# Patient Record
Sex: FEMALE | Race: WHITE | NOT HISPANIC OR LATINO | Employment: FULL TIME | URBAN - METROPOLITAN AREA
[De-identification: names, ages, dates, MRNs, and addresses within clinical notes are randomized per-mention and may not be internally consistent; named-entity substitution may affect disease eponyms.]

---

## 2017-01-10 ENCOUNTER — OFFICE VISIT - HEALTHEAST (OUTPATIENT)
Dept: INTERNAL MEDICINE | Facility: CLINIC | Age: 62
End: 2017-01-10

## 2017-01-10 DIAGNOSIS — R05.9 COUGH: ICD-10-CM

## 2017-01-10 LAB
CHOLEST SERPL-MCNC: 261 MG/DL
FASTING STATUS PATIENT QL REPORTED: YES
HDLC SERPL-MCNC: 57 MG/DL
LDLC SERPL CALC-MCNC: 156 MG/DL
TRIGL SERPL-MCNC: 240 MG/DL

## 2017-01-10 ASSESSMENT — MIFFLIN-ST. JEOR: SCORE: 1175.71

## 2017-01-12 ENCOUNTER — COMMUNICATION - HEALTHEAST (OUTPATIENT)
Dept: INTERNAL MEDICINE | Facility: CLINIC | Age: 62
End: 2017-01-12

## 2017-02-12 ENCOUNTER — COMMUNICATION - HEALTHEAST (OUTPATIENT)
Dept: INTERNAL MEDICINE | Facility: CLINIC | Age: 62
End: 2017-02-12

## 2017-03-14 ENCOUNTER — COMMUNICATION - HEALTHEAST (OUTPATIENT)
Dept: INTERNAL MEDICINE | Facility: CLINIC | Age: 62
End: 2017-03-14

## 2017-03-27 ENCOUNTER — OFFICE VISIT - HEALTHEAST (OUTPATIENT)
Dept: INTERNAL MEDICINE | Facility: CLINIC | Age: 62
End: 2017-03-27

## 2017-03-27 DIAGNOSIS — N39.0 UTI (URINARY TRACT INFECTION): ICD-10-CM

## 2017-03-27 ASSESSMENT — MIFFLIN-ST. JEOR: SCORE: 1180.25

## 2017-03-28 ENCOUNTER — COMMUNICATION - HEALTHEAST (OUTPATIENT)
Dept: INTERNAL MEDICINE | Facility: CLINIC | Age: 62
End: 2017-03-28

## 2017-04-19 ENCOUNTER — COMMUNICATION - HEALTHEAST (OUTPATIENT)
Dept: INTERNAL MEDICINE | Facility: CLINIC | Age: 62
End: 2017-04-19

## 2017-04-27 ENCOUNTER — COMMUNICATION - HEALTHEAST (OUTPATIENT)
Dept: INTERNAL MEDICINE | Facility: CLINIC | Age: 62
End: 2017-04-27

## 2017-04-27 DIAGNOSIS — I10 UNSPECIFIED ESSENTIAL HYPERTENSION: ICD-10-CM

## 2017-05-24 ENCOUNTER — COMMUNICATION - HEALTHEAST (OUTPATIENT)
Dept: INTERNAL MEDICINE | Facility: CLINIC | Age: 62
End: 2017-05-24

## 2017-06-28 ENCOUNTER — OFFICE VISIT - HEALTHEAST (OUTPATIENT)
Dept: INTERNAL MEDICINE | Facility: CLINIC | Age: 62
End: 2017-06-28

## 2017-06-28 DIAGNOSIS — J06.9 URI (UPPER RESPIRATORY INFECTION): ICD-10-CM

## 2017-06-28 ASSESSMENT — MIFFLIN-ST. JEOR: SCORE: 1180.25

## 2017-07-06 ENCOUNTER — COMMUNICATION - HEALTHEAST (OUTPATIENT)
Dept: INTERNAL MEDICINE | Facility: CLINIC | Age: 62
End: 2017-07-06

## 2017-07-24 ENCOUNTER — COMMUNICATION - HEALTHEAST (OUTPATIENT)
Dept: INTERNAL MEDICINE | Facility: CLINIC | Age: 62
End: 2017-07-24

## 2017-08-14 ENCOUNTER — RECORDS - HEALTHEAST (OUTPATIENT)
Dept: ADMINISTRATIVE | Facility: OTHER | Age: 62
End: 2017-08-14

## 2017-10-03 ENCOUNTER — AMBULATORY - HEALTHEAST (OUTPATIENT)
Dept: INTERNAL MEDICINE | Facility: CLINIC | Age: 62
End: 2017-10-03

## 2017-10-03 ENCOUNTER — COMMUNICATION - HEALTHEAST (OUTPATIENT)
Dept: INTERNAL MEDICINE | Facility: CLINIC | Age: 62
End: 2017-10-03

## 2017-10-03 DIAGNOSIS — Z12.31 ENCOUNTER FOR SCREENING MAMMOGRAM FOR BREAST CANCER: ICD-10-CM

## 2017-10-03 DIAGNOSIS — R52 PAIN: ICD-10-CM

## 2017-10-03 DIAGNOSIS — Z12.39 ENCOUNTER FOR BREAST CANCER SCREENING OTHER THAN MAMMOGRAM: ICD-10-CM

## 2017-11-06 ENCOUNTER — HOSPITAL ENCOUNTER (OUTPATIENT)
Dept: MAMMOGRAPHY | Facility: CLINIC | Age: 62
Discharge: HOME | End: 2017-11-06
Attending: INTERNAL MEDICINE

## 2017-11-06 DIAGNOSIS — Z12.31 ENCOUNTER FOR SCREENING MAMMOGRAM FOR BREAST CANCER: ICD-10-CM

## 2018-01-25 ENCOUNTER — RECORDS - HEALTHEAST (OUTPATIENT)
Dept: ADMINISTRATIVE | Facility: OTHER | Age: 63
End: 2018-01-25

## 2018-03-18 ENCOUNTER — COMMUNICATION - HEALTHEAST (OUTPATIENT)
Dept: INTERNAL MEDICINE | Facility: CLINIC | Age: 63
End: 2018-03-18

## 2018-04-30 ENCOUNTER — COMMUNICATION - HEALTHEAST (OUTPATIENT)
Dept: INTERNAL MEDICINE | Facility: CLINIC | Age: 63
End: 2018-04-30

## 2018-05-19 ENCOUNTER — COMMUNICATION - HEALTHEAST (OUTPATIENT)
Dept: INTERNAL MEDICINE | Facility: CLINIC | Age: 63
End: 2018-05-19

## 2018-05-19 DIAGNOSIS — I10 ESSENTIAL HYPERTENSION: ICD-10-CM

## 2018-05-30 ENCOUNTER — OFFICE VISIT - HEALTHEAST (OUTPATIENT)
Dept: INTERNAL MEDICINE | Facility: CLINIC | Age: 63
End: 2018-05-30

## 2018-05-30 DIAGNOSIS — H10.9 CONJUNCTIVITIS OF RIGHT EYE, UNSPECIFIED CONJUNCTIVITIS TYPE: ICD-10-CM

## 2018-05-30 DIAGNOSIS — H57.89 RED EYE: ICD-10-CM

## 2018-05-30 ASSESSMENT — MIFFLIN-ST. JEOR: SCORE: 1212

## 2018-08-29 ENCOUNTER — COMMUNICATION - HEALTHEAST (OUTPATIENT)
Dept: INTERNAL MEDICINE | Facility: CLINIC | Age: 63
End: 2018-08-29

## 2018-10-29 ENCOUNTER — AMBULATORY - HEALTHEAST (OUTPATIENT)
Dept: NURSING | Facility: CLINIC | Age: 63
End: 2018-10-29

## 2018-11-08 ENCOUNTER — COMMUNICATION - HEALTHEAST (OUTPATIENT)
Dept: INTERNAL MEDICINE | Facility: CLINIC | Age: 63
End: 2018-11-08

## 2018-12-14 ENCOUNTER — COMMUNICATION - HEALTHEAST (OUTPATIENT)
Dept: INTERNAL MEDICINE | Facility: CLINIC | Age: 63
End: 2018-12-14

## 2019-01-17 ENCOUNTER — COMMUNICATION - HEALTHEAST (OUTPATIENT)
Dept: INTERNAL MEDICINE | Facility: CLINIC | Age: 64
End: 2019-01-17

## 2019-01-22 ENCOUNTER — COMMUNICATION - HEALTHEAST (OUTPATIENT)
Dept: INTERNAL MEDICINE | Facility: CLINIC | Age: 64
End: 2019-01-22

## 2019-01-23 ENCOUNTER — AMBULATORY - HEALTHEAST (OUTPATIENT)
Dept: INTERNAL MEDICINE | Facility: CLINIC | Age: 64
End: 2019-01-23

## 2019-01-29 ENCOUNTER — OFFICE VISIT - HEALTHEAST (OUTPATIENT)
Dept: INTERNAL MEDICINE | Facility: CLINIC | Age: 64
End: 2019-01-29

## 2019-01-29 ENCOUNTER — AMBULATORY - HEALTHEAST (OUTPATIENT)
Dept: INTERNAL MEDICINE | Facility: CLINIC | Age: 64
End: 2019-01-29

## 2019-01-29 ENCOUNTER — HOSPITAL ENCOUNTER (OUTPATIENT)
Dept: CT IMAGING | Facility: CLINIC | Age: 64
Discharge: HOME | End: 2019-01-29
Attending: INTERNAL MEDICINE

## 2019-01-29 DIAGNOSIS — R10.84 ABDOMINAL PAIN, GENERALIZED: ICD-10-CM

## 2019-01-29 DIAGNOSIS — R31.29 MICROSCOPIC HEMATURIA: ICD-10-CM

## 2019-01-29 DIAGNOSIS — Z12.11 SPECIAL SCREENING FOR MALIGNANT NEOPLASMS, COLON: ICD-10-CM

## 2019-01-29 DIAGNOSIS — Z12.31 VISIT FOR SCREENING MAMMOGRAM: ICD-10-CM

## 2019-01-29 LAB
ALBUMIN SERPL-MCNC: 4.1 G/DL (ref 3.5–5)
ALBUMIN UR-MCNC: NEGATIVE MG/DL
ALP SERPL-CCNC: 91 U/L (ref 45–120)
ALT SERPL W P-5'-P-CCNC: 26 U/L (ref 0–45)
ANION GAP SERPL CALCULATED.3IONS-SCNC: 10 MMOL/L (ref 5–18)
APPEARANCE UR: CLEAR
AST SERPL W P-5'-P-CCNC: 16 U/L (ref 0–40)
BACTERIA #/AREA URNS HPF: ABNORMAL HPF
BILIRUB DIRECT SERPL-MCNC: 0.1 MG/DL
BILIRUB SERPL-MCNC: 0.4 MG/DL (ref 0–1)
BILIRUB UR QL STRIP: NEGATIVE
BUN SERPL-MCNC: 17 MG/DL (ref 8–22)
CALCIUM SERPL-MCNC: 10 MG/DL (ref 8.5–10.5)
CHLORIDE BLD-SCNC: 106 MMOL/L (ref 98–107)
CO2 SERPL-SCNC: 26 MMOL/L (ref 22–31)
COLOR UR AUTO: ABNORMAL
CREAT SERPL-MCNC: 0.87 MG/DL (ref 0.6–1.1)
ERYTHROCYTE [DISTWIDTH] IN BLOOD BY AUTOMATED COUNT: 13.3 % (ref 11–14.5)
GFR SERPL CREATININE-BSD FRML MDRD: >60 ML/MIN/1.73M2
GLUCOSE BLD-MCNC: 112 MG/DL (ref 70–125)
GLUCOSE UR STRIP-MCNC: NEGATIVE MG/DL
HCT VFR BLD AUTO: 40.2 % (ref 35–47)
HGB BLD-MCNC: 13.1 G/DL (ref 12–16)
HGB UR QL STRIP: ABNORMAL
KETONES UR STRIP-MCNC: NEGATIVE MG/DL
LEUKOCYTE ESTERASE UR QL STRIP: NEGATIVE
LIPASE SERPL-CCNC: 28 U/L (ref 0–52)
MCH RBC QN AUTO: 29.8 PG (ref 27–34)
MCHC RBC AUTO-ENTMCNC: 32.6 G/DL (ref 32–36)
MCV RBC AUTO: 92 FL (ref 80–100)
NITRATE UR QL: NEGATIVE
PH UR STRIP: 5.5 [PH] (ref 4.5–8)
PLATELET # BLD AUTO: 352 THOU/UL (ref 140–440)
PMV BLD AUTO: 11.2 FL (ref 8.5–12.5)
POTASSIUM BLD-SCNC: 4.5 MMOL/L (ref 3.5–5)
PROT SERPL-MCNC: 6.9 G/DL (ref 6–8)
RBC # BLD AUTO: 4.39 MILL/UL (ref 3.8–5.4)
RBC #/AREA URNS AUTO: ABNORMAL HPF
SODIUM SERPL-SCNC: 142 MMOL/L (ref 136–145)
SP GR UR STRIP: 1 (ref 1–1.03)
SQUAMOUS #/AREA URNS AUTO: ABNORMAL LPF
UROBILINOGEN UR STRIP-ACNC: ABNORMAL
WBC #/AREA URNS AUTO: ABNORMAL HPF
WBC: 7.9 THOU/UL (ref 4–11)

## 2019-01-29 ASSESSMENT — MIFFLIN-ST. JEOR: SCORE: 1212

## 2019-01-31 ENCOUNTER — COMMUNICATION - HEALTHEAST (OUTPATIENT)
Dept: INTERNAL MEDICINE | Facility: CLINIC | Age: 64
End: 2019-01-31

## 2019-02-11 ENCOUNTER — RECORDS - HEALTHEAST (OUTPATIENT)
Dept: ADMINISTRATIVE | Facility: OTHER | Age: 64
End: 2019-02-11

## 2019-02-19 ENCOUNTER — OFFICE VISIT - HEALTHEAST (OUTPATIENT)
Dept: INTERNAL MEDICINE | Facility: CLINIC | Age: 64
End: 2019-02-19

## 2019-02-19 ENCOUNTER — COMMUNICATION - HEALTHEAST (OUTPATIENT)
Dept: INTERNAL MEDICINE | Facility: CLINIC | Age: 64
End: 2019-02-19

## 2019-02-19 DIAGNOSIS — Z01.818 PREOPERATIVE EXAMINATION: ICD-10-CM

## 2019-02-19 LAB
ALBUMIN SERPL-MCNC: 4.1 G/DL (ref 3.5–5)
ALP SERPL-CCNC: 91 U/L (ref 45–120)
ALT SERPL W P-5'-P-CCNC: 23 U/L (ref 0–45)
ANION GAP SERPL CALCULATED.3IONS-SCNC: 8 MMOL/L (ref 5–18)
AST SERPL W P-5'-P-CCNC: 16 U/L (ref 0–40)
ATRIAL RATE - MUSE: 78 BPM
BILIRUB SERPL-MCNC: 0.3 MG/DL (ref 0–1)
BUN SERPL-MCNC: 14 MG/DL (ref 8–22)
CALCIUM SERPL-MCNC: 9.5 MG/DL (ref 8.5–10.5)
CHLORIDE BLD-SCNC: 106 MMOL/L (ref 98–107)
CO2 SERPL-SCNC: 29 MMOL/L (ref 22–31)
CREAT SERPL-MCNC: 0.83 MG/DL (ref 0.6–1.1)
DIASTOLIC BLOOD PRESSURE - MUSE: NORMAL MMHG
ERYTHROCYTE [DISTWIDTH] IN BLOOD BY AUTOMATED COUNT: 13.1 % (ref 11–14.5)
GFR SERPL CREATININE-BSD FRML MDRD: >60 ML/MIN/1.73M2
GLUCOSE BLD-MCNC: 98 MG/DL (ref 70–125)
HCT VFR BLD AUTO: 38.6 % (ref 35–47)
HGB BLD-MCNC: 13.2 G/DL (ref 12–16)
INTERPRETATION ECG - MUSE: NORMAL
MCH RBC QN AUTO: 29.5 PG (ref 27–34)
MCHC RBC AUTO-ENTMCNC: 34.3 G/DL (ref 32–36)
MCV RBC AUTO: 86 FL (ref 80–100)
P AXIS - MUSE: 51 DEGREES
PLATELET # BLD AUTO: 336 THOU/UL (ref 140–440)
PMV BLD AUTO: 7.9 FL (ref 7–10)
POTASSIUM BLD-SCNC: 4.6 MMOL/L (ref 3.5–5)
PR INTERVAL - MUSE: 174 MS
PROT SERPL-MCNC: 7.1 G/DL (ref 6–8)
QRS DURATION - MUSE: 86 MS
QT - MUSE: 366 MS
QTC - MUSE: 417 MS
R AXIS - MUSE: 61 DEGREES
RBC # BLD AUTO: 4.49 MILL/UL (ref 3.8–5.4)
SODIUM SERPL-SCNC: 143 MMOL/L (ref 136–145)
SYSTOLIC BLOOD PRESSURE - MUSE: NORMAL MMHG
T AXIS - MUSE: 51 DEGREES
VENTRICULAR RATE- MUSE: 78 BPM
WBC: 6.4 THOU/UL (ref 4–11)

## 2019-02-19 ASSESSMENT — MIFFLIN-ST. JEOR: SCORE: 1207.46

## 2019-02-21 ENCOUNTER — SURGERY - HEALTHEAST (OUTPATIENT)
Dept: SURGERY | Facility: CLINIC | Age: 64
End: 2019-02-21

## 2019-02-21 ENCOUNTER — ANESTHESIA - HEALTHEAST (OUTPATIENT)
Dept: SURGERY | Facility: CLINIC | Age: 64
End: 2019-02-21

## 2019-02-21 ASSESSMENT — MIFFLIN-ST. JEOR: SCORE: 1192.73

## 2019-04-06 ENCOUNTER — COMMUNICATION - HEALTHEAST (OUTPATIENT)
Dept: INTERNAL MEDICINE | Facility: CLINIC | Age: 64
End: 2019-04-06

## 2019-04-06 DIAGNOSIS — Z00.00 ROUTINE GENERAL MEDICAL EXAMINATION AT A HEALTH CARE FACILITY: ICD-10-CM

## 2019-06-07 ENCOUNTER — OFFICE VISIT - HEALTHEAST (OUTPATIENT)
Dept: INTERNAL MEDICINE | Facility: CLINIC | Age: 64
End: 2019-06-07

## 2019-06-07 DIAGNOSIS — H61.21 IMPACTED CERUMEN OF RIGHT EAR: ICD-10-CM

## 2019-06-07 DIAGNOSIS — R00.0 TACHYCARDIA: ICD-10-CM

## 2019-06-07 ASSESSMENT — MIFFLIN-ST. JEOR: SCORE: 1194.99

## 2019-06-20 ENCOUNTER — COMMUNICATION - HEALTHEAST (OUTPATIENT)
Dept: INTERNAL MEDICINE | Facility: CLINIC | Age: 64
End: 2019-06-20

## 2019-06-20 DIAGNOSIS — Z00.00 ROUTINE GENERAL MEDICAL EXAMINATION AT A HEALTH CARE FACILITY: ICD-10-CM

## 2019-07-04 ENCOUNTER — COMMUNICATION - HEALTHEAST (OUTPATIENT)
Dept: INTERNAL MEDICINE | Facility: CLINIC | Age: 64
End: 2019-07-04

## 2019-07-04 DIAGNOSIS — Z00.00 ROUTINE GENERAL MEDICAL EXAMINATION AT A HEALTH CARE FACILITY: ICD-10-CM

## 2019-08-29 ENCOUNTER — COMMUNICATION - HEALTHEAST (OUTPATIENT)
Dept: INTERNAL MEDICINE | Facility: CLINIC | Age: 64
End: 2019-08-29

## 2019-08-29 DIAGNOSIS — I10 ESSENTIAL HYPERTENSION: ICD-10-CM

## 2019-10-25 ENCOUNTER — COMMUNICATION - HEALTHEAST (OUTPATIENT)
Dept: INTERNAL MEDICINE | Facility: CLINIC | Age: 64
End: 2019-10-25

## 2019-10-25 DIAGNOSIS — Z00.00 ROUTINE GENERAL MEDICAL EXAMINATION AT A HEALTH CARE FACILITY: ICD-10-CM

## 2020-05-21 ENCOUNTER — COMMUNICATION - HEALTHEAST (OUTPATIENT)
Dept: INTERNAL MEDICINE | Facility: CLINIC | Age: 65
End: 2020-05-21

## 2020-05-21 DIAGNOSIS — I10 ESSENTIAL HYPERTENSION: ICD-10-CM

## 2020-05-21 DIAGNOSIS — Z00.00 ROUTINE GENERAL MEDICAL EXAMINATION AT A HEALTH CARE FACILITY: ICD-10-CM

## 2020-10-11 ENCOUNTER — COMMUNICATION - HEALTHEAST (OUTPATIENT)
Dept: INTERNAL MEDICINE | Facility: CLINIC | Age: 65
End: 2020-10-11

## 2020-10-11 DIAGNOSIS — Z00.00 ROUTINE GENERAL MEDICAL EXAMINATION AT A HEALTH CARE FACILITY: ICD-10-CM

## 2020-10-14 ENCOUNTER — AMBULATORY - HEALTHEAST (OUTPATIENT)
Dept: INTERNAL MEDICINE | Facility: CLINIC | Age: 65
End: 2020-10-14

## 2020-10-14 DIAGNOSIS — Z20.822 COVID-19 RULED OUT: ICD-10-CM

## 2020-10-22 ENCOUNTER — COMMUNICATION - HEALTHEAST (OUTPATIENT)
Dept: INTERNAL MEDICINE | Facility: CLINIC | Age: 65
End: 2020-10-22

## 2020-10-26 ENCOUNTER — RECORDS - HEALTHEAST (OUTPATIENT)
Dept: ADMINISTRATIVE | Facility: OTHER | Age: 65
End: 2020-10-26

## 2021-02-10 ENCOUNTER — HOSPITAL ENCOUNTER (OUTPATIENT)
Dept: MAMMOGRAPHY | Facility: CLINIC | Age: 66
Discharge: HOME | End: 2021-02-10
Attending: INTERNAL MEDICINE

## 2021-02-10 DIAGNOSIS — Z12.31 SCREENING MAMMOGRAM, ENCOUNTER FOR: ICD-10-CM

## 2021-05-24 ENCOUNTER — RECORDS - HEALTHEAST (OUTPATIENT)
Dept: ADMINISTRATIVE | Facility: CLINIC | Age: 66
End: 2021-05-24

## 2021-05-25 ENCOUNTER — RECORDS - HEALTHEAST (OUTPATIENT)
Dept: ADMINISTRATIVE | Facility: CLINIC | Age: 66
End: 2021-05-25

## 2021-05-27 ENCOUNTER — RECORDS - HEALTHEAST (OUTPATIENT)
Dept: ADMINISTRATIVE | Facility: CLINIC | Age: 66
End: 2021-05-27

## 2021-05-28 ENCOUNTER — RECORDS - HEALTHEAST (OUTPATIENT)
Dept: ADMINISTRATIVE | Facility: CLINIC | Age: 66
End: 2021-05-28

## 2021-05-29 ENCOUNTER — RECORDS - HEALTHEAST (OUTPATIENT)
Dept: ADMINISTRATIVE | Facility: CLINIC | Age: 66
End: 2021-05-29

## 2021-05-29 NOTE — TELEPHONE ENCOUNTER
Refill Approved    Rx renewed per Medication Renewal Policy. Medication was last renewed on 4/7/2019 for 100/0.  Last OV 6/7/2019    Cate Cheek, [facility] Triage/Med Refill 6/22/2019     Requested Prescriptions   Pending Prescriptions Disp Refills     pantoprazole (PROTONIX) 40 MG tablet 90 tablet 0     Sig: Take 1 tablet (40 mg total) by mouth daily.       GI Medications Refill Protocol Passed - 6/20/2019 10:00 AM        Passed - PCP or prescribing provider visit in last 12 or next 3 months.     Last office visit with prescriber/PCP: 1/29/2019 Juan Washington MD OR same dept: 6/7/2019 Adamaris Burch MD OR same specialty: 6/7/2019 Adamaris Burch MD  Last physical: 2/19/2019 Last MTM visit: Visit date not found   Next visit within 3 mo: Visit date not found  Next physical within 3 mo: Visit date not found  Prescriber OR PCP: Juan Washington MD  Last diagnosis associated with med order: 1. Routine general medical examination at a health care facility  - pantoprazole (PROTONIX) 40 MG tablet; Take 1 tablet (40 mg total) by mouth daily.  Dispense: 90 tablet; Refill: 0    If protocol passes may refill for 12 months if within 3 months of last provider visit (or a total of 15 months).

## 2021-05-29 NOTE — PROGRESS NOTES
Office Visit   Sarah Cisneros   64 y.o. female    Date of Visit: 6/7/2019    Chief Complaint   Patient presents with     Ear Fullness     Right ear plugged this week, no pain, trouble hearing        Assessment and Plan   1. Impacted cerumen of right ear  Will ask the CA to do a ear wash today.    2. Tachycardia  Her pulse is tachycardic but regular.  She just came from a track meet and states that she was walking fast and it was very hot.  Recommend that she keep a close eye on her pulse and if it remains over 100 over the next few days then she should come back in for reevaluation.  She understands and is in agreement with this plan.       No follow-ups on file.     History of Present Illness   This 64 y.o. old who comes in with a plugged right ear.  She has not been able to hear very well out of the right ear for the last week.  She has not had any symptoms of upper respiratory illness.  No cough, congestion, drainage, fever or chills.  She has had a plugged ear with cerumen in the past.  She has no other acute concerns today.  Her pulse was fast when her vitals were taken and she states [place]  She had to walk a long way to get to her car and is very warm out.  She is not having any chest pain or dizziness.  She does not check her vitals at home.    Review of Systems: As above, systems otherwise reviewed and negative.     Medications, Allergies and Problem List   Patient Active Problem List   Diagnosis     Lyme Disease     Hyperlipidemia     Essential Hypertension     Blood In Urine     Labyrinthitis     Lipoma     Current Outpatient Medications   Medication Sig Dispense Refill     atorvastatin (LIPITOR) 20 MG tablet TAKE 1 TABLET (20 MG TOTAL) BY MOUTH DAILY. 90 tablet 0     lisinopril-hydrochlorothiazide (PRINZIDE,ZESTORETIC) 10-12.5 mg per tablet TAKE 1 TABLET BY MOUTH DAILY. 30 tablet 11     pantoprazole (PROTONIX) 40 MG tablet TAKE 1 TABLET (40 MG TOTAL) BY MOUTH  "DAILY. 100 tablet 0     No current facility-administered medications for this visit.      No Known Allergies       Physical Exam     /82 (Patient Site: Right Arm, Patient Position: Sitting)   Pulse (!) 116   Ht 4' 9\" (1.448 m)   Wt 170 lb (77.1 kg)   SpO2 98%   BMI 36.79 kg/m      General: This is an alert, well-appearing female, sitting comfortably, no apparent distress.  HEENT: Her right external canal is occluded with cerumen.  Left external canal is open with some cerumen in the canal.  Heart: She is a regular rhythm with no murmurs.  Rate is tachycardic around 115.     Additional Information   Social History     Tobacco Use     Smoking status: Never Smoker     Smokeless tobacco: Never Used   Substance Use Topics     Alcohol use: No     Comment: rare     Drug use: No          Adamaris Burch MD    "

## 2021-05-30 ENCOUNTER — RECORDS - HEALTHEAST (OUTPATIENT)
Dept: ADMINISTRATIVE | Facility: CLINIC | Age: 66
End: 2021-05-30

## 2021-05-30 VITALS — BODY MASS INDEX: 34.43 KG/M2 | WEIGHT: 164 LBS | HEIGHT: 58 IN

## 2021-05-30 VITALS — BODY MASS INDEX: 34.63 KG/M2 | HEIGHT: 58 IN | WEIGHT: 165 LBS

## 2021-05-31 ENCOUNTER — RECORDS - HEALTHEAST (OUTPATIENT)
Dept: ADMINISTRATIVE | Facility: CLINIC | Age: 66
End: 2021-05-31

## 2021-05-31 VITALS — BODY MASS INDEX: 34.63 KG/M2 | WEIGHT: 165 LBS | HEIGHT: 58 IN

## 2021-05-31 NOTE — TELEPHONE ENCOUNTER
Refill Approved    Rx renewed per Medication Renewal Policy. Medication was last renewed on 5/19/18.    Roseanne BREE Austin, [facility] Triage/Med Refill 8/29/2019     Requested Prescriptions   Pending Prescriptions Disp Refills     lisinopril-hydrochlorothiazide (PRINZIDE,ZESTORETIC) 10-12.5 mg per tablet 90 tablet 3     Sig: Take 1 tablet by mouth daily.       Diuretics/Combination Diuretics Refill Protocol  Passed - 8/29/2019 11:18 AM        Passed - Visit with PCP or prescribing provider visit in past 12 months     Last office visit with prescriber/PCP: 1/29/2019 Juan Washington MD OR same dept: 6/7/2019 Adamaris Burch MD OR same specialty: 6/7/2019 Adamaris Burch MD  Last physical: 2/19/2019 Last MTM visit: Visit date not found   Next visit within 3 mo: Visit date not found  Next physical within 3 mo: Visit date not found  Prescriber OR PCP: Juan Washington MD  Last diagnosis associated with med order: 1. Essential hypertension  - lisinopril-hydrochlorothiazide (PRINZIDE,ZESTORETIC) 10-12.5 mg per tablet; Take 1 tablet by mouth daily.  Dispense: 90 tablet; Refill: 3    If protocol passes may refill for 12 months if within 3 months of last provider visit (or a total of 15 months).             Passed - Serum Potassium in past 12 months      Lab Results   Component Value Date    Potassium 4.6 02/19/2019             Passed - Serum Sodium in past 12 months      Lab Results   Component Value Date    Sodium 143 02/19/2019             Passed - Blood pressure on file in past 12 months     BP Readings from Last 1 Encounters:   06/07/19 132/82             Passed - Serum Creatinine in past 12 months      Creatinine   Date Value Ref Range Status   02/19/2019 0.83 0.60 - 1.10 mg/dL Final

## 2021-06-01 VITALS — BODY MASS INDEX: 36.11 KG/M2 | HEIGHT: 58 IN | WEIGHT: 172 LBS

## 2021-06-02 ENCOUNTER — RECORDS - HEALTHEAST (OUTPATIENT)
Dept: ADMINISTRATIVE | Facility: CLINIC | Age: 66
End: 2021-06-02

## 2021-06-02 VITALS — BODY MASS INDEX: 36.57 KG/M2 | HEIGHT: 57 IN | WEIGHT: 169.5 LBS

## 2021-06-02 VITALS — WEIGHT: 172 LBS | BODY MASS INDEX: 36.11 KG/M2 | HEIGHT: 58 IN

## 2021-06-02 VITALS — HEIGHT: 58 IN | WEIGHT: 171 LBS | BODY MASS INDEX: 35.89 KG/M2

## 2021-06-02 NOTE — TELEPHONE ENCOUNTER
Refill Approved    Rx renewed per Medication Renewal Policy. Medication was last renewed on 7/5/19.    Nimo Dacosta, [facility] Triage/Med Refill 10/25/2019     Requested Prescriptions   Pending Prescriptions Disp Refills     atorvastatin (LIPITOR) 20 MG tablet 90 tablet 0     Sig: Take 1 tablet (20 mg total) by mouth daily.       Statins Refill Protocol (Hmg CoA Reductase Inhibitors) Passed - 10/25/2019  8:34 AM        Passed - PCP or prescribing provider visit in past 12 months      Last office visit with prescriber/PCP: 1/29/2019 Juan Washington MD OR same dept: 6/7/2019 Adamaris Burch MD OR same specialty: 6/7/2019 Adamaris Burch MD  Last physical: 2/19/2019 Last MTM visit: Visit date not found   Next visit within 3 mo: Visit date not found  Next physical within 3 mo: Visit date not found  Prescriber OR PCP: Juan Washington MD  Last diagnosis associated with med order: 1. Routine general medical examination at a health care facility  - atorvastatin (LIPITOR) 20 MG tablet; Take 1 tablet (20 mg total) by mouth daily.  Dispense: 90 tablet; Refill: 0    If protocol passes may refill for 12 months if within 3 months of last provider visit (or a total of 15 months).

## 2021-06-03 VITALS — BODY MASS INDEX: 36.68 KG/M2 | HEIGHT: 57 IN | WEIGHT: 170 LBS

## 2021-06-08 NOTE — PROGRESS NOTES
Office Visit - Follow up    Sarah Cisneros   61 y.o. female    Date of Visit: 1/10/2017    Chief Complaint   Patient presents with     Cough     x 3 weeks     Shortness of Breath     Fatigue       Subjective: Chief complaint cough.    3 weeks' duration, which shortness of breath and generalized fatigue appears pale feels hot feels cold long duration never had a cough this morning.  Denies hemoptysis she is a nonsmoker the cough is described as dry nonproductive.  Feels more tired no chest pain but there is dyspnea on exertion she denies wheezing.    Colonoscopy dated July 13, 2011 by colorectal surgery group showed one isolated hyperplastic polyp.  Mammogram negative August 30, 2016.    No blood in stool or urine no blood in sputum.    Medication list reviewed generally well-tolerated.    ROS: A comprehensive review of systems was performed and was otherwise negative    Medications:  Prior to Admission medications    Medication Sig Start Date End Date Taking? Authorizing Provider   lisinopril-hydrochlorothiazide (PRINZIDE,ZESTORETIC) 10-12.5 mg per tablet TAKE 1 TABLET BY MOUTH DAILY. 4/16/16  Yes Juan Washington MD   pantoprazole (PROTONIX) 40 MG tablet TAKE 1 TABLET (40 MG TOTAL) BY MOUTH DAILY. 10/1/16  Yes Juan Washington MD   azithromycin (ZITHROMAX Z-JEMMA) 250 MG tablet Take 2 tablets (500 mg) on  Day 1,  followed by 1 tablet (250 mg) once daily on Days 2 through 5. 1/10/17 1/15/17  Juan Washington MD       Allergies: No Known Allergies    Immunizations:   Immunization History   Administered Date(s) Administered     Influenza, seasonal,quad inj 6-35 mos 10/22/2013     Tdap 01/02/2008       Exam Chest clear to auscultation and percussion.  Heart tones regular rhythm without murmur rub or gallop.  Abdomen soft nontender no organomegaly.  No peritoneal signs.  Extremities free of edema cyanosis or clubbing.  Neck veins nondistended no thyromegaly or scleral icterus noted, carotids full.  Skin warm and  dry easily conversant good spirited.  Normal intelligence.  Neurologically intact no gross localizing findings.    Assessment and Plan   Cough uncertain etiology check chest x-ray plus, principal laboratory tests including hemogram, principal to poor profile plus lipid panel and TSH today.  Start Z-Lorne as directed 5 day course plus hold lisinopril HCTZ for hypertension as lisinopril component may be causing cough.    Acid reflux disease also may be contributing to cough from silent reflux.    Hyperplastic colon polyp cecal mass.  Reports July 13, 2011.    Obesity BMI 35.  Addendum O2 sats 98% temperature 97.7.  RTC one months time.    Time: total time spent with the patient was 25 minutes of which >50% was spent in counseling and coordination of care    The following high BMI interventions were performed this visit: encouragement to exercise    Juan Washington MD    Patient Active Problem List   Diagnosis     Lyme Disease     Hyperlipidemia     Essential Hypertension     Blood In Urine     Labyrinthitis     Lipoma

## 2021-06-09 NOTE — PROGRESS NOTES
Office Visit - Follow up    Sarah Cisneros   61 y.o. female    Date of Visit: 3/27/2017    Chief Complaint   Patient presents with     Anal Itching     and also some burning on urinastion       Subjective: UTI.  Burning on urination with rectal itching.  Discussed hemorrhoid treatment with pump soaks plus Metamucil plus Preparation H with hydrocortisone topically and if no better consultation with colorectal specialist Dr. Susannah Velazquez at [phone].    The patient offers no other new complaints the patient has had a history of hypertension hyperlipidemia and acid reflux.    Her BMI is elevated at 35 see below.    No blood in stool or urine medication list reviewed generally well-tolerated.  Denies chest pain or shortness of breath and no hemoptysis.    ROS: A comprehensive review of systems was performed and was otherwise negative    Medications:  Prior to Admission medications    Medication Sig Start Date End Date Taking? Authorizing Provider   atorvastatin (LIPITOR) 20 MG tablet TAKE 1 TABLET (20 MG TOTAL) BY MOUTH DAILY. 3/14/17  Yes Juan Washington MD   lisinopril-hydrochlorothiazide (PRINZIDE,ZESTORETIC) 10-12.5 mg per tablet TAKE 1 TABLET BY MOUTH DAILY. 4/16/16  Yes Juan Washington MD   pantoprazole (PROTONIX) 40 MG tablet TAKE 1 TABLET (40 MG TOTAL) BY MOUTH DAILY. 10/1/16  Yes Juan Washington MD   ciprofloxacin HCl (CIPRO) 250 MG tablet Take 1 tablet (250 mg total) by mouth 2 (two) times a day for 10 days. 3/27/17 4/6/17  Juan Washingtno MD       Allergies: No Known Allergies    Immunizations:   Immunization History   Administered Date(s) Administered     Influenza, seasonal,quad inj 6-35 mos 10/22/2013     Tdap 01/02/2008       Exam Chest clear to auscultation and percussion.  Heart tones regular rhythm without murmur rub or gallop.  Abdomen soft nontender no organomegaly.  No peritoneal signs.  Extremities free of edema cyanosis or clubbing.  Neck veins nondistended no thyromegaly or  scleral icterus noted, carotids full.  Skin warm and dry easily conversant good spirited.  Normal intelligence.  Neurologically intact no gross localizing findings.    Assessment and Plan  Urinary tract infection check UA UC plus ciprofloxacin 250 mg twice daily for 10 days.  Push fluids.    Hemorrhoids likely external no vaginal discharge or discharge otherwise.  Suggest high-fiber diet Preparation H with hydrocortisone OTC topically to the perianal area if no better in a few days time suggest formal consultation with colorectal specialty group Dr. Susannah Velazquez presiding at [phone].    Hypertension control.    Obesity BMI elevated at 35+ see below.    Hyperlipidemia no history of MI or stroke continue Lipitor same.    Acid reflux disease continue Protonix 40 mg daily RTC 1 month's time for follow-up.    Time: total time spent with the patient was 25 minutes of which >50% was spent in counseling and coordination of care    The following high BMI interventions were performed this visit: encouragement to exercise    Juan Washington MD    Patient Active Problem List   Diagnosis     Lyme Disease     Hyperlipidemia     Essential Hypertension     Blood In Urine     Labyrinthitis     Lipoma

## 2021-06-11 NOTE — PROGRESS NOTES
[facility] Follow Up Note    Sarah Cisneros   62 y.o. female    Date of Visit: 6/28/2017    Chief Complaint   Patient presents with     Fever     at night, highest 101.9     Sore Throat     pain moving up to ear, 3 days     Subjective  This is a 62-year-old patient of Dr. Juan Washington.  She comes in with a 3 day history of a respiratory infection.  Symptoms have included a sore throat, ear discomfort, headache, and fever of 101 .  She denies any cough.  The symptoms have not necessarily worsened but they have not improved.  She offers no other particular complaints or problems at this time.    ROS A comprehensive review of systems was performed and was otherwise negative    Medications, allergies, and problem list were reviewed and updated    Exam  General Appearance:   On examination her blood pressure is 134/70.  Weight is 165 pounds and height is 69.5 inches.  BMI is 35.09.    Heart is in a sinus rhythm with a rate of 98 and no ectopy.    Both ears show some cerumen but otherwise appear clear.    Some tenderness over the frontal sinuses.    There are a couple of small enlarged cervical lymph nodes on the right side.    Throat appears erythematous and swollen without exudate.    The patient is alert and oriented ×3.      Assessment/Plan  1. URI (upper respiratory infection)       Upper respiratory infection.  She has responded well in the past to Zithromax and so I will order a Z-Lorne for her.  She will follow-up if she is not improving.  Body Mass Index was not assessed due to The patient was in with an acute medical issue..    Dennis Murray MD      Current Outpatient Prescriptions on File Prior to Visit   Medication Sig     atorvastatin (LIPITOR) 20 MG tablet TAKE 1 TABLET (20 MG TOTAL) BY MOUTH DAILY.     lisinopril-hydrochlorothiazide (PRINZIDE,ZESTORETIC) 10-12.5 mg per tablet TAKE 1 TABLET BY MOUTH DAILY.     pantoprazole (PROTONIX) 40 MG tablet TAKE 1 TABLET (40 MG TOTAL) BY  MOUTH DAILY.     No current facility-administered medications on file prior to visit.      No Known Allergies  Social History   Substance Use Topics     Smoking status: Never Smoker     Smokeless tobacco: None     Alcohol use No      Comment: rare

## 2021-06-12 NOTE — TELEPHONE ENCOUNTER
RN cannot approve Refill Request    RN can NOT refill this medication Protocol failed and NO refill given. Last office visit: 1/29/2019 Juan Washington MD Last Physical: 2/19/2019 Last MTM visit: Visit date not found Last visit same specialty: 6/7/2019 Adamaris Burch MD.  Next visit within 3 mo: Visit date not found  Next physical within 3 mo: Visit date not found      Nimo Dacosta, [facility] Connection Triage/Med Refill 10/13/2020    Requested Prescriptions   Pending Prescriptions Disp Refills     atorvastatin (LIPITOR) 20 MG tablet 90 tablet 2     Sig: Take 1 tablet (20 mg total) by mouth daily.       Statins Refill Protocol (Hmg CoA Reductase Inhibitors) Failed - 10/11/2020  7:07 PM        Failed - PCP or prescribing provider visit in past 12 months      Last office visit with prescriber/PCP: 1/29/2019 Juan Washington MD OR same dept: Visit date not found OR same specialty: 6/7/2019 Adamaris Burch MD  Last physical: 2/19/2019 Last MTM visit: Visit date not found   Next visit within 3 mo: Visit date not found  Next physical within 3 mo: Visit date not found  Prescriber OR PCP: Juan Washington MD  Last diagnosis associated with med order: 1. Routine general medical examination at a health care facility  - atorvastatin (LIPITOR) 20 MG tablet; Take 1 tablet (20 mg total) by mouth daily.  Dispense: 90 tablet; Refill: 2    If protocol passes may refill for 12 months if within 3 months of last provider visit (or a total of 15 months).

## 2021-06-18 NOTE — PROGRESS NOTES
"  Office Visit - Follow Up   Sarah Cisneros   63 y.o. female    Date of Visit: 5/30/2018    Chief Complaint   Patient presents with     Eye Problem     Rt red eye x 1 wk - no recent injuries        Assessment and Plan   1. Red eye  I am concerned about a possible viral conjunctivitis, may be herpes lesion etc.  We will have her see ophthalmology.  In the meantime I will give her some bacterial eyedrops to see if this makes a difference.  She is in agreement with that plan.  - Ambulatory referral to Ophthalmology    2. Conjunctivitis of right eye, unspecified conjunctivitis type  As above  - polymyxin B-trimethoprim (FOR POLYTRIM) 10,000 unit- 1 mg/mL Drop ophthalmic drops; 1-2 drops to the affected eye 4 (four) times a day for 7 days  Dispense: 1 Bottle; Refill: 0        Return if symptoms worsen or fail to improve.     History of Present Illness   This 63 y.o. old patient has been feeling well until about a week ago developed acute red eye.  Gets more inflamed and irritated at times and more red appearance.  Then he will get better again but always stays somewhat red.  No drainage.  No pain or vision loss or vision change.  She states it feels somewhat quite \"gravelly.  No trauma.    Review of Systems: A comprehensive review of systems was negative except as noted.     Medications, Allergies and Problem List   Reviewed and updated     Physical Exam   General Appearance:   She has conjunctival irritation and inflammation redness in the medial aspect of the right eye.  There is no sub-conjunctival hemorrhage as I can tell.    /80 (Patient Site: Right Arm, Patient Position: Sitting, Cuff Size: Adult Regular)  Pulse 84  Ht 4' 9.5\" (1.461 m)  Wt 172 lb (78 kg)  SpO2 98%  BMI 36.58 kg/m2         Additional Information   Current Outpatient Prescriptions   Medication Sig Dispense Refill     atorvastatin (LIPITOR) 20 MG tablet TAKE 1 TABLET (20 MG TOTAL) BY MOUTH DAILY. 90 tablet 0     " lisinopril-hydrochlorothiazide (PRINZIDE,ZESTORETIC) 10-12.5 mg per tablet TAKE 1 TABLET BY MOUTH DAILY. 30 tablet 11     pantoprazole (PROTONIX) 40 MG tablet TAKE 1 TABLET (40 MG TOTAL) BY MOUTH DAILY. 100 tablet 1     polymyxin B-trimethoprim (FOR POLYTRIM) 10,000 unit- 1 mg/mL Drop ophthalmic drops 1-2 drops to the affected eye 4 (four) times a day for 7 days 1 Bottle 0     No current facility-administered medications for this visit.      No Known Allergies  Social History   Substance Use Topics     Smoking status: Never Smoker     Smokeless tobacco: Never Used     Alcohol use No      Comment: rare       Review and/or order of clinical lab tests:  Review and/or order of radiology tests:  Review and/or order of medicine tests:  Discussion of test results with performing physician:  Decision to obtain old records and/or obtain history from someone other than the patient:  Review and summarization of old records and/or obtaining history from someone other than the patient and.or discussion of case with another health care provider:  Independent visualization of image, tracing or specimen itself:    Time: total time spent with the patient was 15 minutes of which >50% was spent in counseling and coordination of care     Theo Childs MD

## 2021-06-18 NOTE — LETTER
Letter by Juan Washington MD at      Author: Juan Washington MD Service: -- Author Type: --    Filed:  Encounter Date: 1/31/2019 Status: (Other)       Sarah Cisneros  [address] [facility] [address]             January 31, 2019         Dear Ms. Cisneros,    Below are the results from your recent visit:    Resulted Orders   HM2(CBC w/o Differential)   Result Value Ref Range    WBC 7.9 4.0 - 11.0 thou/uL    RBC 4.39 3.80 - 5.40 mill/uL    Hemoglobin 13.1 12.0 - 16.0 g/dL    Hematocrit 40.2 35.0 - 47.0 %    MCV 92 80 - 100 fL    MCH 29.8 27.0 - 34.0 pg    MCHC 32.6 32.0 - 36.0 g/dL    RDW 13.3 11.0 - 14.5 %    Platelets 352 140 - 440 thou/uL    MPV 11.2 8.5 - 12.5 fL   Basic Metabolic Panel   Result Value Ref Range    Sodium 142 136 - 145 mmol/L    Potassium 4.5 3.5 - 5.0 mmol/L    Chloride 106 98 - 107 mmol/L    CO2 26 22 - 31 mmol/L    Anion Gap, Calculation 10 5 - 18 mmol/L    Glucose 112 70 - 125 mg/dL    Calcium 10.0 8.5 - 10.5 mg/dL    BUN 17 8 - 22 mg/dL    Creatinine 0.87 0.60 - 1.10 mg/dL    GFR MDRD Af Amer >60 >60 mL/min/1.73m2    GFR MDRD Non Af Amer >60 >60 mL/min/1.73m2    Narrative    Fasting Glucose reference range is 70-99 mg/dL per  American Diabetes Association (ADA) guidelines.   Hepatic Profile   Result Value Ref Range    Bilirubin, Total 0.4 0.0 - 1.0 mg/dL    Bilirubin, Direct 0.1 <=0.5 mg/dL    Protein, Total 6.9 6.0 - 8.0 g/dL    Albumin 4.1 3.5 - 5.0 g/dL    Alkaline Phosphatase 91 45 - 120 U/L    AST 16 0 - 40 U/L    ALT 26 0 - 45 U/L   Lipase   Result Value Ref Range    Lipase 28 0 - 52 U/L   Urinalysis-UC if Indicated   Result Value Ref Range    Color, UA Straw Colorless, Yellow, Straw, Light Yellow    Clarity, UA Clear Clear    Glucose, UA Negative Negative    Bilirubin, UA Negative Negative    Ketones, UA Negative Negative    Specific Gravity, UA 1.004 1.001 - 1.030    Blood, UA Trace (!) Negative    pH, UA 5.5 4.5 - 8.0    Protein, UA Negative Negative mg/dL     Urobilinogen, UA <2.0 E.U./dL <2.0 E.U./dL, 2.0 E.U./dL    Nitrite, UA Negative Negative    Leukocytes, UA Negative Negative    Bacteria, UA Few (!) None Seen hpf    RBC, UA 3-5 (!) None Seen, 0-2 hpf    WBC, UA 0-5 None Seen, 0-5 hpf    Squam Epithel, UA 25-50 (!) None Seen, 0-5 lpf    Narrative    UC not indicated       All very good results  ------  Microscopic hematuria noted and would suggest a formal consultation at [facility].  Please call patient for this advice that she should seek out [facility] consultation for microscopic hematuria.  [phone] is [facility] number    Please call with questions or contact us using [employer].    Sincerely,        Electronically signed by Juna Washington MD

## 2021-06-18 NOTE — LETTER
Letter by Juan Washington MD at      Author: Juan Washington MD Service: -- Author Type: --    Filed:  Encounter Date: 2/19/2019 Status: (Other)       Sarah Cisneros  [address] [facility] [address]             February 19, 2019         Dear Ms. Cisneros,    Below are the results from your recent visit:    Resulted Orders   HM2(CBC w/o Differential)   Result Value Ref Range    WBC 6.4 4.0 - 11.0 thou/uL    RBC 4.49 3.80 - 5.40 mill/uL    Hemoglobin 13.2 12.0 - 16.0 g/dL    Hematocrit 38.6 35.0 - 47.0 %    MCV 86 80 - 100 fL    MCH 29.5 27.0 - 34.0 pg    MCHC 34.3 32.0 - 36.0 g/dL    RDW 13.1 11.0 - 14.5 %    Platelets 336 140 - 440 thou/uL    MPV 7.9 7.0 - 10.0 fL   Comprehensive Metabolic Panel   Result Value Ref Range    Sodium 143 136 - 145 mmol/L    Potassium 4.6 3.5 - 5.0 mmol/L    Chloride 106 98 - 107 mmol/L    CO2 29 22 - 31 mmol/L    Anion Gap, Calculation 8 5 - 18 mmol/L    Glucose 98 70 - 125 mg/dL    BUN 14 8 - 22 mg/dL    Creatinine 0.83 0.60 - 1.10 mg/dL    GFR MDRD Af Amer >60 >60 mL/min/1.73m2    GFR MDRD Non Af Amer >60 >60 mL/min/1.73m2    Bilirubin, Total 0.3 0.0 - 1.0 mg/dL    Calcium 9.5 8.5 - 10.5 mg/dL    Protein, Total 7.1 6.0 - 8.0 g/dL    Albumin 4.1 3.5 - 5.0 g/dL    Alkaline Phosphatase 91 45 - 120 U/L    AST 16 0 - 40 U/L    ALT 23 0 - 45 U/L    Narrative    Fasting Glucose reference range is 70-99 mg/dL per  American Diabetes Association (ADA) guidelines.       All very good results    Please call with questions or contact us using [employer].    Sincerely,        Electronically signed by Juan Washington MD

## 2021-06-23 NOTE — TELEPHONE ENCOUNTER
Left message to call back for: Sarah  Information to relay to patient:  She saw [facility] in 7/2011 for her last colonoscopy- was she not happy with them? It does not look like she is due for a colonoscopy until 2021- is she having GI issues? Is she looking to have one done sooner?

## 2021-06-23 NOTE — TELEPHONE ENCOUNTER
Pt calling for recommendation for a colonoscopy provider    Please place order if appropriate    Thank you

## 2021-06-23 NOTE — PROGRESS NOTES
Office Visit - Follow up    Sarah Cisneros   63 y.o. female    Date of Visit: 1/29/2019    Chief Complaint   Patient presents with     Abdominal Pain     lower right     Back Pain     lower right       Subjective: Abdominal pain right-sided near McBurney's point.  Symptoms for 2-3 weeks.  No loss of appetite no nausea or vomiting.  No fever chills.  Symptoms are better today.  Lower back discomfort for 2 weeks as well.  No pain on urination no blood in stool or urine no chest pain shortness of breath.  Patient needs to see specialty scheduling regarding colonoscopy and mammographic testing as well.  This would be for screening purposes but first we must deal with the abdominal pain at hand.    The patient is status post complete hysterectomy she is tender at McBurney's point.    ROS: A comprehensive review of systems was performed and was otherwise negative    Medications:  Prior to Admission medications    Medication Sig Start Date End Date Taking? Authorizing Provider   atorvastatin (LIPITOR) 20 MG tablet TAKE 1 TABLET (20 MG TOTAL) BY MOUTH DAILY. 12/14/18  Yes Juan Washington MD   lisinopril-hydrochlorothiazide (PRINZIDE,ZESTORETIC) 10-12.5 mg per tablet TAKE 1 TABLET BY MOUTH DAILY. 5/19/18  Yes Juan Washington MD   pantoprazole (PROTONIX) 40 MG tablet TAKE 1 TABLET (40 MG TOTAL) BY MOUTH DAILY. 1/22/19  Yes Juan Washington MD   polymyxin B-trimethoprim (FOR POLYTRIM) 10,000 unit- 1 mg/mL Drop ophthalmic drops 1-2 drops to the affected eye 4 (four) times a day for 7 days 5/30/18 1/29/19  Theo Childs MD       Allergies: No Known Allergies    Immunizations:   Immunization History   Administered Date(s) Administered     INFLUENZA,RECOMBINANT,INJ,PF QUADRIVALENT 18+YRS 10/29/2018     Influenza, seasonal,quad inj 6-35 mos 10/22/2013     Tdap 01/02/2008       Exam Chest clear to auscultation and percussion.  Heart tones regular rhythm without murmur rub or gallop.  Abdomen soft nontender no  organomegaly.  No peritoneal signs.  Extremities free of edema cyanosis or clubbing.  Neck veins nondistended no thyromegaly or scleral icterus noted, carotids full.  Skin warm and dry easily conversant good spirited.  Normal intelligence.  Neurologically intact no gross localizing findings.  Tender at McBurney's point on the right.  No rebound tenderness no peritoneal signs.    Last mammogram allCLEAR November 6, 2017.  Colonoscopy showed hyperplastic colon polyp dated July 13, 2011 [facility]    Assessment and Plan  Abdominal pain right lower quadrant area and generalized.  No peritoneal signs.  No rebound tenderness.  Check labs to include hemogram basic metabolic profile Paddock profile serum lipase urinalysis plus CT scan of abdomen and pelvis without prep.    Needs to schedule colonoscopy and mammographic testing as well.    Hysterectomy complete tenderness at McBurney's point.    Low back pain may be contributing.    Obesity BMI 37.  142/80 pulse 90 respirations 18 O2 sats 97% weight stable 172 pounds.    Time: total time spent with the patient was 40 minutes of which >50% was spent in counseling and coordination of care    The following high BMI interventions were performed this visit: encouragement to exercise    Juan Washington MD    Patient Active Problem List   Diagnosis     Lyme Disease     Hyperlipidemia     Essential Hypertension     Blood In Urine     Labyrinthitis     Lipoma

## 2021-06-23 NOTE — TELEPHONE ENCOUNTER
Left message to call back for: Colonoscopy questions  Information to relay to patient:  See below message with info regarding her original call.

## 2021-06-24 NOTE — ANESTHESIA PREPROCEDURE EVALUATION
Anesthesia Evaluation      Patient summary reviewed   History of anesthetic complications (PONV)     Airway   Mallampati: II  Neck ROM: full   Pulmonary - negative ROS    breath sounds clear to auscultation  (-) rhonchi, wheezes, rales, stridor                         Cardiovascular   Exercise tolerance: > or = 4 METS  (+) hypertension, ,     (-) murmur  Rhythm: regular  Rate: normal,    no murmur      Neuro/Psych - negative ROS     Endo/Other - negative ROS   (+) obesity (BMI 36.67),      GI/Hepatic/Renal    (+) GERD,        Other findings: Relevant Meds:  Lisinopril  Pantoprazole    Labs 2/19/19:  Hgb 13.2, Plt 336  Na 143, K 4.6, BUN 14, Cr 0.83        Dental - normal exam                        Anesthesia Plan  Planned anesthetic: general LMA  GA LMA.  Hx of PONV and plan for scopolamine patch, dexamethasone, zofran and low-dose propofol gtt (25-50 mcg/kg/min).    ASA 2   Induction: intravenous   Anesthetic plan and risks discussed with: spouse and patient  Anesthesia plan special considerations: antiemetics,   Post-op plan: routine recovery

## 2021-06-24 NOTE — PROGRESS NOTES
Office Visit - Physical    Sarah Cisneros   63 y.o. female    Date of Visit: 2/19/2019    Chief Complaint   Patient presents with     Pre-op Exam     Cystos copy transurethral resection of bladder lesion  Dr. Jesus Moncada at [facility] on 2/21/2019       Subjective: Preoperative examination in anticipation of cystoscopy with transurethral resection of the bladder lesion.  [facility] on Thursday, February 21, 2019 with Dr. Jesus Moncada.    63-year-old female non-smoker.  Asymptomatic was noted previously to have microscopic blood in the urine with negative exam and now again microscopic blood in the urine and referred to urology.  Bladder lesion noted.    Mammogram allCLEAR November 6, 2017.  [facility] except for a benign hyperplastic polyp.    Non-smoker no excess alcohol no known drug allergies.    ROS: A comprehensive review of systems was performed and was otherwise negative    Medications:   Prior to Admission medications    Medication Sig Start Date End Date Taking? Authorizing Provider   atorvastatin (LIPITOR) 20 MG tablet TAKE 1 TABLET (20 MG TOTAL) BY MOUTH DAILY. 12/14/18  Yes Juan Washington MD   lisinopril-hydrochlorothiazide (PRINZIDE,ZESTORETIC) 10-12.5 mg per tablet TAKE 1 TABLET BY MOUTH DAILY. 5/19/18  Yes Juan Washington MD   pantoprazole (PROTONIX) 40 MG tablet TAKE 1 TABLET (40 MG TOTAL) BY MOUTH DAILY. 1/22/19  Yes Juan Washington MD       Allergies:No Known Allergies    Immunizations:   Immunization History   Administered Date(s) Administered     INFLUENZA,RECOMBINANT,INJ,PF QUADRIVALENT 18+YRS 10/29/2018     Influenza, seasonal,quad inj 6-35 mos 10/22/2013     Tdap 01/02/2008       Health Maintenance: Immunizations reviewed and up-to-date.    Past Medical History: Hypertension and acid reflux and history of shingles anterior chest treated successfully with acyclovir.    Past Surgical History: Hysterectomy for benign  disease 20+ years ago.  No prior problems with anesthesia.    Family History: Mother  lung cancer smoker age 63.    Father  ruptured abdominal aortic aneurysm age 64.    2 children well  also patient of this examiner well.    Social History: Maintains active lifestyle.  No constitutional or otherwise any symptoms with microscopic hematuria.  Found as part of full physical exam with this examiner.    Exam Chest clear to auscultation and percussion.  Heart tones regular rhythm without murmur rub or gallop.  Abdomen soft nontender no organomegaly.  No peritoneal signs.  Extremities free of edema cyanosis or clubbing.  Neck veins nondistended no thyromegaly or scleral icterus noted, carotids full.  Skin warm and dry easily conversant good spirited.  Normal intelligence.  Neurologically intact no gross localizing findings.    Assessment and Plan  Medically acceptable risk for anticipated surgery.  No prior problems with general anesthesia for this patient.  There is no family history of malignant hyperthermia associated with general anesthesia.    Electrocardiogram showed a sinus mechanism normal EKG rate 78.    Hemogram comprehensive metabolic profile pending.  BMI elevated at 36+.  138/80 pulse 86 respirations 18 O2 sats 98% on room air.    Total time spent with the patient today was 40 minutes of which greater than 50% was spent in counseling and coordination of care.    Juan Washington MD    Patient Active Problem List   Diagnosis     Lyme Disease     Hyperlipidemia     Essential Hypertension     Blood In Urine     Labyrinthitis     Lipoma

## 2021-06-24 NOTE — ANESTHESIA CARE TRANSFER NOTE
Last vitals:   Vitals:    02/21/19 1452   BP: 120/62   Pulse: 85   Resp: 14   Temp: 36.7  C (98  F)   SpO2: 100%     Patient's level of consciousness is drowsy  Spontaneous respirations: yes  Maintains airway independently: yes  Dentition unchanged: yes  Oropharynx: oropharynx clear of all foreign objects    QCDR Measures:  ASA# 20 - Surgical Safety Checklist: WHO surgical safety checklist completed prior to induction    PQRS# 430 - Adult PONV Prevention: 4558F - Pt received => 2 anti-emetic agents (different classes) preop & intraop  ASA# 8 - Peds PONV Prevention: NA - Not pediatric patient, not GA or 2 or more risk factors NOT present  PQRS# 424 - Ying-op Temp Management: 4559F - At least one body temp DOCUMENTED => 35.5C or 95.9F within required timeframe  PQRS# 426 - PACU Transfer Protocol: - Transfer of care checklist used  ASA# 14 - Acute Post-op Pain: ASA14B - Patient did NOT experience pain >= 7 out of 10

## 2021-06-24 NOTE — ANESTHESIA POSTPROCEDURE EVALUATION
Patient: Sarah Cisneros  CYSTOSCOPY, TRANSURETHRAL RESECTION OF BLADDER LESION  Anesthesia type: general    Patient location: PACU  Last vitals:   Vitals:    02/21/19 1630   BP: 161/77   Pulse: 69   Resp: 18   Temp: 36.6  C (97.9  F)   SpO2: 97%     Post vital signs: stable  Level of consciousness: awake and responds to simple questions  Post-anesthesia pain: pain controlled  Post-anesthesia nausea and vomiting: no  Pulmonary: unassisted  Cardiovascular: stable  Hydration: adequate  Anesthetic events: no    QCDR Measures:  ASA# 11 - Ying-op Cardiac Arrest: ASA11B - Patient did NOT experience unanticipated cardiac arrest  ASA# 12 - Ying-op Mortality Rate: ASA12B - Patient did NOT die  ASA# 13 - PACU Re-Intubation Rate: ASA13B - Patient did NOT require a new airway mgmt  ASA# 10 - Composite Anes Safety: ASA10A - No serious adverse event    Additional Notes:

## 2021-07-03 NOTE — ADDENDUM NOTE
Addendum Note by Susannah Zablaa CMA at 1/29/2019  9:20 AM     Author: Susannah Zabala CMA Service: -- Author Type: Certified Medical Assistant    Filed: 1/29/2019 12:43 PM Encounter Date: 1/29/2019 Status: Signed    : Susannah Zabala CMA (Certified Medical Assistant)    Addended by: SUSANNAH ZABALA on: 1/29/2019 12:43 PM        Modules accepted: Orders

## 2021-07-04 ENCOUNTER — COMMUNICATION - HEALTHEAST (OUTPATIENT)
Dept: INTERNAL MEDICINE | Facility: CLINIC | Age: 66
End: 2021-07-04

## 2021-07-04 DIAGNOSIS — Z00.00 ROUTINE GENERAL MEDICAL EXAMINATION AT A HEALTH CARE FACILITY: ICD-10-CM

## 2021-07-04 NOTE — TELEPHONE ENCOUNTER
Telephone Encounter by Angela Engel RN at 7/4/2021  7:28 AM     Author: Angela Engel RN Service: -- Author Type: Registered Nurse    Filed: 7/4/2021  7:30 AM Encounter Date: 7/4/2021 Status: Signed    : Angela Engel RN (Registered Nurse)       RN cannot approve Refill Request    RN can NOT refill this medication PCP messaged that patient is overdue for Office Visit. Last office visit: 1/29/2019 Juan Washington MD Last Physical: 2/19/2019 Last MTM visit: Visit date not found Last visit same specialty: 6/7/2019 Adamaris Burch MD.  Next visit within 3 mo: Visit date not found  Next physical within 3 mo: Visit date not found      [facility] Connection Triage/Med Refill 7/4/2021    Requested Prescriptions   Pending Prescriptions Disp Refills   ? atorvastatin (LIPITOR) 20 MG tablet [Pharmacy Med Name: ATORVASTATIN 20MG TABLETS] 90 tablet 2     Sig: TAKE 1 TABLET(20 MG) BY MOUTH DAILY       Statins Refill Protocol (Hmg CoA Reductase Inhibitors) Failed - 7/4/2021  5:21 AM        Failed - PCP or prescribing provider visit in past 12 months      Last office visit with prescriber/PCP: 1/29/2019 Juan Washington MD OR same dept: Visit date not found OR same specialty: 6/7/2019 Adamaris Burch MD  Last physical: 2/19/2019 Last MTM visit: Visit date not found   Next visit within 3 mo: Visit date not found  Next physical within 3 mo: Visit date not found  Prescriber OR PCP: Juan Washington MD  Last diagnosis associated with med order: 1. Routine general medical examination at a health care facility  - atorvastatin (LIPITOR) 20 MG tablet [Pharmacy Med Name: ATORVASTATIN 20MG TABLETS]; TAKE 1 TABLET(20 MG) BY MOUTH DAILY  Dispense: 90 tablet; Refill: 2    If protocol passes may refill for 12 months if within 3 months of last provider visit (or a total of 15 months).

## 2021-07-15 ENCOUNTER — OFFICE VISIT (OUTPATIENT)
Dept: INTERNAL MEDICINE | Facility: CLINIC | Age: 66
End: 2021-07-15
Payer: COMMERCIAL

## 2021-07-15 VITALS
HEIGHT: 57 IN | WEIGHT: 168 LBS | DIASTOLIC BLOOD PRESSURE: 80 MMHG | OXYGEN SATURATION: 99 % | HEART RATE: 81 BPM | BODY MASS INDEX: 36.24 KG/M2 | SYSTOLIC BLOOD PRESSURE: 132 MMHG

## 2021-07-15 DIAGNOSIS — E66.01 MORBID OBESITY (H): ICD-10-CM

## 2021-07-15 DIAGNOSIS — I10 ESSENTIAL HYPERTENSION: Primary | ICD-10-CM

## 2021-07-15 PROCEDURE — 99213 OFFICE O/P EST LOW 20 MIN: CPT | Performed by: INTERNAL MEDICINE

## 2021-07-15 RX ORDER — ATORVASTATIN CALCIUM 20 MG/1
20 TABLET, FILM COATED ORAL DAILY
Qty: 90 TABLET | Refills: 3 | Status: SHIPPED | OUTPATIENT
Start: 2021-07-15 | End: 2022-10-04

## 2021-07-15 RX ORDER — PANTOPRAZOLE SODIUM 40 MG/1
40 TABLET, DELAYED RELEASE ORAL DAILY
Qty: 90 TABLET | Refills: 3 | Status: SHIPPED | OUTPATIENT
Start: 2021-07-15 | End: 2022-10-04

## 2021-07-15 RX ORDER — LISINOPRIL/HYDROCHLOROTHIAZIDE 10-12.5 MG
1 TABLET ORAL DAILY
Qty: 90 TABLET | Refills: 3 | Status: SHIPPED | OUTPATIENT
Start: 2021-07-15 | End: 2022-07-07

## 2021-07-15 RX ORDER — ATORVASTATIN CALCIUM 20 MG/1
20 TABLET, FILM COATED ORAL DAILY
COMMUNITY
End: 2021-07-15

## 2021-07-15 ASSESSMENT — MIFFLIN-ST. JEOR: SCORE: 1185.92

## 2021-07-15 NOTE — PROGRESS NOTES
"    Assessment & Plan     Hypertension controlled 132/80.    Obesity discussed importance of weight loss with caloric restriction.    Hyperlipidemia no history of target organ damage related to same continue statin at same.  No history of MI or stroke.      25 minutes spent on the date of the encounter doing chart review, patient visit and documentation        BMI:   Estimated body mass index is 36.35 kg/m  as calculated from the following:    Height as of this encounter: 1.448 m (4' 9\").    Weight as of this encounter: 76.2 kg (168 lb).   Lose weight by avoiding salt in diet exercising more and fewer calories in        No follow-ups on file.    Juan Washington MD  [facility] LEILANI Smith is a 66 year old who presents for the following health issues     HPI       Trying to exercise more to lose weight with restriction of calories and may help.  This would also help blood pressure at hyperlipidemia.    Review of Systems   No blood in stool or urine denies chest pain shortness of breath.  Generally feels well.  Offered laboratory testing patient declined.  Non-smoker no excess alcohol no known drug allergies.   living well and supportive.      Objective    /80 (BP Location: Right arm, Patient Position: Sitting)   Pulse 81   Ht 1.448 m (4' 9\")   Wt 76.2 kg (168 lb)   SpO2 99%   BMI 36.35 kg/m    Body mass index is 36.35 kg/m .  Physical Exam   GENERAL: healthy, alert and no distress  NECK: no adenopathy, no asymmetry, masses, or scars and thyroid normal to palpation  RESP: lungs clear to auscultation - no rales, rhonchi or wheezes  CV: regular rate and rhythm, normal S1 S2, no S3 or S4, no murmur, click or rub, no peripheral edema and peripheral pulses strong  ABDOMEN: soft, nontender, no hepatosplenomegaly, no masses and bowel sounds normal  MS: no gross musculoskeletal defects noted, no edema                "

## 2021-07-21 ENCOUNTER — RECORDS - HEALTHEAST (OUTPATIENT)
Dept: ADMINISTRATIVE | Facility: CLINIC | Age: 66
End: 2021-07-21

## 2021-08-09 ENCOUNTER — TELEPHONE (OUTPATIENT)
Dept: INTERNAL MEDICINE | Facility: CLINIC | Age: 66
End: 2021-08-09

## 2021-08-09 NOTE — TELEPHONE ENCOUNTER
Reason for Call:  Other appointment    Detailed comments: having back pain- wants to see Dr. Rebolledo only    Phone Number Patient can be reached at: Cell number on file:    Telephone Information:   Mobile [phone]       Best Time: today, as she is working from home    Can we leave a detailed message on this number? YES    Call taken on 8/9/2021 at 8:23 AM by Ellen Moreno

## 2021-08-12 ENCOUNTER — OFFICE VISIT (OUTPATIENT)
Dept: INTERNAL MEDICINE | Facility: CLINIC | Age: 66
End: 2021-08-12
Payer: COMMERCIAL

## 2021-08-12 VITALS
SYSTOLIC BLOOD PRESSURE: 144 MMHG | DIASTOLIC BLOOD PRESSURE: 84 MMHG | OXYGEN SATURATION: 98 % | HEIGHT: 57 IN | BODY MASS INDEX: 36.68 KG/M2 | WEIGHT: 170 LBS | HEART RATE: 97 BPM

## 2021-08-12 DIAGNOSIS — M54.50 LEFT LOW BACK PAIN, UNSPECIFIED CHRONICITY, UNSPECIFIED WHETHER SCIATICA PRESENT: Primary | ICD-10-CM

## 2021-08-12 LAB
ALBUMIN SERPL-MCNC: 4.7 G/DL (ref 3.5–5)
ALBUMIN UR-MCNC: NEGATIVE MG/DL
ALP SERPL-CCNC: 96 U/L (ref 45–120)
ALT SERPL W P-5'-P-CCNC: 41 U/L (ref 0–45)
ANION GAP SERPL CALCULATED.3IONS-SCNC: 12 MMOL/L (ref 5–18)
APPEARANCE UR: CLEAR
AST SERPL W P-5'-P-CCNC: 22 U/L (ref 0–40)
BACTERIA #/AREA URNS HPF: ABNORMAL /HPF
BILIRUB SERPL-MCNC: 0.4 MG/DL (ref 0–1)
BILIRUB UR QL STRIP: NEGATIVE
BUN SERPL-MCNC: 14 MG/DL (ref 8–22)
CALCIUM SERPL-MCNC: 10.3 MG/DL (ref 8.5–10.5)
CHLORIDE BLD-SCNC: 101 MMOL/L (ref 98–107)
CO2 SERPL-SCNC: 28 MMOL/L (ref 22–31)
COLOR UR AUTO: YELLOW
CREAT SERPL-MCNC: 0.87 MG/DL (ref 0.6–1.1)
ERYTHROCYTE [DISTWIDTH] IN BLOOD BY AUTOMATED COUNT: 12.8 % (ref 10–15)
ERYTHROCYTE [SEDIMENTATION RATE] IN BLOOD BY WESTERGREN METHOD: 9 MM/HR (ref 0–20)
GFR SERPL CREATININE-BSD FRML MDRD: 71 ML/MIN/1.73M2
GLUCOSE BLD-MCNC: 94 MG/DL (ref 70–125)
GLUCOSE UR STRIP-MCNC: NEGATIVE MG/DL
HCT VFR BLD AUTO: 40.1 % (ref 35–47)
HGB BLD-MCNC: 13.5 G/DL (ref 11.7–15.7)
HGB UR QL STRIP: ABNORMAL
KETONES UR STRIP-MCNC: NEGATIVE MG/DL
LEUKOCYTE ESTERASE UR QL STRIP: ABNORMAL
MCH RBC QN AUTO: 30.3 PG (ref 26.5–33)
MCHC RBC AUTO-ENTMCNC: 33.7 G/DL (ref 31.5–36.5)
MCV RBC AUTO: 90 FL (ref 78–100)
NITRATE UR QL: NEGATIVE
PH UR STRIP: 6 [PH] (ref 5–8)
PLATELET # BLD AUTO: 352 10E3/UL (ref 150–450)
POTASSIUM BLD-SCNC: 4.5 MMOL/L (ref 3.5–5)
PROT SERPL-MCNC: 7.3 G/DL (ref 6–8)
RBC # BLD AUTO: 4.46 10E6/UL (ref 3.8–5.2)
RBC #/AREA URNS AUTO: ABNORMAL /HPF
SODIUM SERPL-SCNC: 141 MMOL/L (ref 136–145)
SP GR UR STRIP: 1.01 (ref 1–1.03)
SQUAMOUS #/AREA URNS AUTO: ABNORMAL /LPF
UROBILINOGEN UR STRIP-ACNC: 0.2 E.U./DL
WBC # BLD AUTO: 9.2 10E3/UL (ref 4–11)
WBC #/AREA URNS AUTO: ABNORMAL /HPF

## 2021-08-12 PROCEDURE — 85652 RBC SED RATE AUTOMATED: CPT | Performed by: INTERNAL MEDICINE

## 2021-08-12 PROCEDURE — 80053 COMPREHEN METABOLIC PANEL: CPT | Performed by: INTERNAL MEDICINE

## 2021-08-12 PROCEDURE — 36415 COLL VENOUS BLD VENIPUNCTURE: CPT | Performed by: INTERNAL MEDICINE

## 2021-08-12 PROCEDURE — 81001 URINALYSIS AUTO W/SCOPE: CPT | Performed by: INTERNAL MEDICINE

## 2021-08-12 PROCEDURE — 99214 OFFICE O/P EST MOD 30 MIN: CPT | Performed by: INTERNAL MEDICINE

## 2021-08-12 PROCEDURE — 85027 COMPLETE CBC AUTOMATED: CPT | Performed by: INTERNAL MEDICINE

## 2021-08-12 RX ORDER — BETAMETHASONE DIPROPIONATE 0.5 MG/G
LOTION TOPICAL
COMMUNITY
Start: 2021-03-09 | End: 2023-07-28

## 2021-08-12 ASSESSMENT — MIFFLIN-ST. JEOR: SCORE: 1194.99

## 2021-08-12 NOTE — LETTER
August 13, 2021      Sarah Cisneros  [address] [facility] [address]        Dear ,    We are writing to inform you of your test results.    Microscopic hematuria and would suggest a formal consultation with [facility] at [phone].  I will send a formal order for urology consult    Resulted Orders   CBC with platelets   Result Value Ref Range    WBC Count 9.2 4.0 - 11.0 10e3/uL    RBC Count 4.46 3.80 - 5.20 10e6/uL    Hemoglobin 13.5 11.7 - 15.7 g/dL    Hematocrit 40.1 35.0 - 47.0 %    MCV 90 78 - 100 fL    MCH 30.3 26.5 - 33.0 pg    MCHC 33.7 31.5 - 36.5 g/dL    RDW 12.8 10.0 - 15.0 %    Platelet Count 352 150 - 450 10e3/uL   Comprehensive metabolic panel   Result Value Ref Range    Sodium 141 136 - 145 mmol/L    Potassium 4.5 3.5 - 5.0 mmol/L    Chloride 101 98 - 107 mmol/L    Carbon Dioxide (CO2) 28 22 - 31 mmol/L    Anion Gap 12 5 - 18 mmol/L    Urea Nitrogen 14 8 - 22 mg/dL    Creatinine 0.87 0.60 - 1.10 mg/dL    Calcium 10.3 8.5 - 10.5 mg/dL    Glucose 94 70 - 125 mg/dL    Alkaline Phosphatase 96 45 - 120 U/L    AST 22 0 - 40 U/L    ALT 41 0 - 45 U/L    Protein Total 7.3 6.0 - 8.0 g/dL    Albumin 4.7 3.5 - 5.0 g/dL    Bilirubin Total 0.4 0.0 - 1.0 mg/dL    GFR Estimate 71 >60 mL/min/1.73m2      Comment:      As of July 11, 2021, eGFR is calculated by the CKD-EPI creatinine equation, without race adjustment. eGFR can be influenced by muscle mass, exercise, and diet. The reported eGFR is an estimation only and is only applicable if the renal function is stable.   Erythrocyte sedimentation rate auto   Result Value Ref Range    Erythrocyte Sedimentation Rate 9 0 - 20 mm/hr   UA reflex to Microscopic and Culture   Result Value Ref Range    Color Urine Yellow Colorless, Straw, Light Yellow, Yellow    Appearance Urine Clear Clear    Glucose Urine Negative Negative mg/dL    Bilirubin Urine Negative Negative    Ketones Urine Negative Negative mg/dL    Specific Gravity Urine 1.010 1.005 -  1.030    Blood Urine Small (A) Negative    pH Urine 6.0 5.0 - 8.0    Protein Albumin Urine Negative Negative mg/dL    Urobilinogen Urine 0.2 0.2, 1.0 E.U./dL    Nitrite Urine Negative Negative    Leukocyte Esterase Urine Small (A) Negative   Urine Microscopic   Result Value Ref Range    Bacteria Urine Few (A) None Seen /HPF    RBC Urine 2-5 (A) 0-2 /HPF /HPF    WBC Urine 0-5 0-5 /HPF /HPF    Squamous Epithelials Urine Few (A) None Seen /LPF    Narrative    Urine Culture not indicated       If you have any questions or concerns, please call the clinic at the number listed above.       Sincerely,      Juan Washington MD

## 2021-08-12 NOTE — PROGRESS NOTES
"Assessment/Plan:    Low back pain left side left SI joint area.  Check x-ray of pelvis with bilateral hip plus sed rate urinalysis hemogram and comprehensive metabolic profile today.  Possible sacroiliitis may be a candidate for ibuprofen therapy 600 mg 3 times a day for a limited period 7 to 10 days time.  Not interested in colonoscopy.    Constipation already on stool softener consider again Metamucil 1 heaping tablespoon per day.  Patient declines colonoscopy at this juncture.    25 minutes spent on the date of the encounter doing chart review, interpretation of tests, patient visit and documentation     Subjective:  Sarah Cisneros is a 66 year old female presents for the following health issues low back pain without injury especially left side SI joint area tenderness not in the CVA area bilaterally.  No antecedent injury not worse with walking exacerbated by change in position from sitting to standing.    Constipation using stool softener suggest Metamucil.  Not interested in colonoscopy.  Insurance problems.  May be a candidate for colonoscopy later or Cologuard testing.  Prefer colonoscopy.    ROS:  No blood in stool or urine medication list reviewed reconciled in the chart non-smoker no excess alcohol.  No known drug allergies.    Objective:  BP (!) 144/84   Pulse 97   Ht 1.448 m (4' 9\")   Wt 77.1 kg (170 lb)   SpO2 98%   BMI 36.79 kg/m    Back spine negative to exam mild tenderness noted left SI joint area.  She appeared well not acutely ill not toxic.  Recheck blood pressure 144/84.  "

## 2021-08-13 ENCOUNTER — TELEPHONE (OUTPATIENT)
Dept: UROLOGY | Facility: CLINIC | Age: 66
End: 2021-08-13

## 2021-08-13 ENCOUNTER — TELEPHONE (OUTPATIENT)
Dept: INTERNAL MEDICINE | Facility: CLINIC | Age: 66
End: 2021-08-13

## 2021-08-13 NOTE — TELEPHONE ENCOUNTER
[facility] Call Center    Phone Message    May a detailed message be left on voicemail: yes     Reason for Call: Appointment Intake    Referring Provider Name: Juan Washington MD in [facility] INTERNAL MEDICINE    Diagnosis and/or Symptoms: Left low back pain, unspecified chronicity, unspecified whether sciatica present - looks like the reason for referral was hematuria.  Please review and follow up with the Pt.  Thank you!    Action Taken: Message routed to:  Other: [facility]    Travel Screening: Not Applicable

## 2021-08-13 NOTE — TELEPHONE ENCOUNTER
----- Message from Juan Washington MD sent at 8/13/2021  6:26 AM CDT -----  Please call patient for me and tell her she has degenerative arthritic changes seen in both hips.  But no sign of fracture or anything life-threatening.  It may be advisable to see an orthopedist at [facility]

## 2021-08-14 ENCOUNTER — APPOINTMENT (OUTPATIENT)
Dept: INTERNAL MEDICINE | Facility: CLINIC | Age: 66
End: 2021-08-14
Payer: COMMERCIAL

## 2021-08-14 ENCOUNTER — HEALTH MAINTENANCE LETTER (OUTPATIENT)
Age: 66
End: 2021-08-14

## 2021-10-09 ENCOUNTER — HEALTH MAINTENANCE LETTER (OUTPATIENT)
Age: 66
End: 2021-10-09

## 2022-07-06 DIAGNOSIS — I10 ESSENTIAL HYPERTENSION: ICD-10-CM

## 2022-07-07 RX ORDER — LISINOPRIL/HYDROCHLOROTHIAZIDE 10-12.5 MG
1 TABLET ORAL DAILY
Qty: 90 TABLET | Refills: 3 | Status: SHIPPED | OUTPATIENT
Start: 2022-07-07 | End: 2022-11-17

## 2022-07-07 NOTE — TELEPHONE ENCOUNTER
"Routing refill request to provider for review/approval because:  Patient needs to be seen because it has been more than 1 year since last office visit.    Last Written Prescription Date:  7/15/21  Last Fill Quantity: 90,  # refills: 3   Last office visit provider:  8/12/21     Requested Prescriptions   Pending Prescriptions Disp Refills     lisinopril-hydrochlorothiazide (ZESTORETIC) 10-12.5 MG tablet [Pharmacy Med Name: LISINOPRIL-HCTZ 10/12.5MG TABLETS] 90 tablet 3     Sig: TAKE 1 TABLET BY MOUTH DAILY       Diuretics (Including Combos) Protocol Failed - 7/6/2022  5:22 AM        Failed - Blood pressure under 140/90 in past 12 months     BP Readings from Last 3 Encounters:   08/12/21 (!) 144/84   07/15/21 132/80   11/10/16 141/86                 Passed - Recent (12 mo) or future (30 days) visit within the authorizing provider's specialty     Patient has had an office visit with the authorizing provider or a provider within the authorizing providers department within the previous 12 mos or has a future within next 30 days. See \"Patient Info\" tab in inbasket, or \"Choose Columns\" in Meds & Orders section of the refill encounter.              Passed - Medication is active on med list        Passed - Patient is age 18 or older        Passed - No active pregancy on record        Passed - Normal serum creatinine on file in past 12 months     Recent Labs   Lab Test 08/12/21  1559   CR 0.87              Passed - Normal serum potassium on file in past 12 months     Recent Labs   Lab Test 08/12/21  1559   POTASSIUM 4.5                    Passed - Normal serum sodium on file in past 12 months     Recent Labs   Lab Test 08/12/21  1559                 Passed - No positive pregnancy test in past 12 months       ACE Inhibitors (Including Combos) Protocol Failed - 7/6/2022  5:22 AM        Failed - Blood pressure under 140/90 in past 12 months     BP Readings from Last 3 Encounters:   08/12/21 (!) 144/84   07/15/21 132/80 " "  11/10/16 141/86                 Passed - Recent (12 mo) or future (30 days) visit within the authorizing provider's specialty     Patient has had an office visit with the authorizing provider or a provider within the authorizing providers department within the previous 12 mos or has a future within next 30 days. See \"Patient Info\" tab in inbasket, or \"Choose Columns\" in Meds & Orders section of the refill encounter.              Passed - Medication is active on med list        Passed - Patient is age 18 or older        Passed - No active pregnancy on record        Passed - Normal serum creatinine on file in past 12 months     Recent Labs   Lab Test 08/12/21  1559   CR 0.87       Ok to refill medication if creatinine is low          Passed - Normal serum potassium on file in past 12 months     Recent Labs   Lab Test 08/12/21  1559   POTASSIUM 4.5             Passed - No positive pregnancy test within past 12 months             Nimo Dacosta, RN 07/06/22 7:54 PM  "

## 2022-07-10 ENCOUNTER — HEALTH MAINTENANCE LETTER (OUTPATIENT)
Age: 67
End: 2022-07-10

## 2022-07-11 ENCOUNTER — TRANSFERRED RECORDS (OUTPATIENT)
Dept: FAMILY MEDICINE | Facility: CLINIC | Age: 67
End: 2022-07-11

## 2022-09-11 ENCOUNTER — HEALTH MAINTENANCE LETTER (OUTPATIENT)
Age: 67
End: 2022-09-11

## 2022-10-05 ENCOUNTER — MYC REFILL (OUTPATIENT)
Dept: INTERNAL MEDICINE | Facility: CLINIC | Age: 67
End: 2022-10-05

## 2022-10-05 DIAGNOSIS — I10 ESSENTIAL HYPERTENSION: ICD-10-CM

## 2022-10-05 RX ORDER — ATORVASTATIN CALCIUM 20 MG/1
20 TABLET, FILM COATED ORAL DAILY
Qty: 30 TABLET | Refills: 0 | OUTPATIENT
Start: 2022-10-05

## 2022-10-05 RX ORDER — PANTOPRAZOLE SODIUM 40 MG/1
40 TABLET, DELAYED RELEASE ORAL DAILY
Qty: 30 TABLET | Refills: 0 | OUTPATIENT
Start: 2022-10-05

## 2022-11-03 DIAGNOSIS — I10 ESSENTIAL HYPERTENSION: ICD-10-CM

## 2022-11-04 RX ORDER — ATORVASTATIN CALCIUM 20 MG/1
TABLET, FILM COATED ORAL
Qty: 90 TABLET | Refills: 11 | Status: SHIPPED | OUTPATIENT
Start: 2022-11-04 | End: 2022-11-17

## 2022-11-04 RX ORDER — PANTOPRAZOLE SODIUM 40 MG/1
TABLET, DELAYED RELEASE ORAL
Qty: 90 TABLET | Refills: 11 | Status: SHIPPED | OUTPATIENT
Start: 2022-11-04 | End: 2022-11-17

## 2022-11-04 NOTE — TELEPHONE ENCOUNTER
"Routing refill request to provider for review/approval because:  Labs not current:  ldl  Due to be seen    Last Written Prescription Date:  10/4/22  Last Fill Quantity: 30,  # refills: 0   Last office visit provider:  8/12/21     Requested Prescriptions   Pending Prescriptions Disp Refills     pantoprazole (PROTONIX) 40 MG EC tablet [Pharmacy Med Name: PANTOPRAZOLE 40MG TABLETS] 90 tablet      Sig: TAKE 1 TABLET(40 MG) BY MOUTH DAILY       PPI Protocol Passed - 11/3/2022  5:20 AM        Passed - Not on Clopidogrel (unless Pantoprazole ordered)        Passed - No diagnosis of osteoporosis on record        Passed - Recent (12 mo) or future (30 days) visit within the authorizing provider's specialty     Patient has had an office visit with the authorizing provider or a provider within the authorizing providers department within the previous 12 mos or has a future within next 30 days. See \"Patient Info\" tab in inbasket, or \"Choose Columns\" in Meds & Orders section of the refill encounter.              Passed - Medication is active on med list        Passed - Patient is age 18 or older        Passed - No active pregnacy on record        Passed - No positive pregnancy test in past 12 months           atorvastatin (LIPITOR) 20 MG tablet [Pharmacy Med Name: ATORVASTATIN 20MG TABLETS] 90 tablet      Sig: TAKE 1 TABLET(20 MG) BY MOUTH DAILY       Statins Protocol Failed - 11/3/2022  5:20 AM        Failed - LDL on file in past 12 months     Recent Labs   Lab Test 01/10/17  1459   *             Passed - No abnormal creatine kinase in past 12 months     No lab results found.             Passed - Recent (12 mo) or future (30 days) visit within the authorizing provider's specialty     Patient has had an office visit with the authorizing provider or a provider within the authorizing providers department within the previous 12 mos or has a future within next 30 days. See \"Patient Info\" tab in inbasket, or \"Choose Columns\" in " Meds & Orders section of the refill encounter.              Passed - Medication is active on med list        Passed - Patient is age 18 or older        Passed - No active pregnancy on record        Passed - No positive pregnancy test in past 12 months             Orinda, Nimo, RN 11/04/22 11:48 AM

## 2022-11-17 ENCOUNTER — OFFICE VISIT (OUTPATIENT)
Dept: INTERNAL MEDICINE | Facility: CLINIC | Age: 67
End: 2022-11-17
Payer: COMMERCIAL

## 2022-11-17 VITALS
HEIGHT: 59 IN | OXYGEN SATURATION: 97 % | WEIGHT: 160 LBS | SYSTOLIC BLOOD PRESSURE: 128 MMHG | DIASTOLIC BLOOD PRESSURE: 82 MMHG | TEMPERATURE: 98 F | RESPIRATION RATE: 18 BRPM | BODY MASS INDEX: 32.25 KG/M2 | HEART RATE: 78 BPM

## 2022-11-17 DIAGNOSIS — I10 ESSENTIAL HYPERTENSION: ICD-10-CM

## 2022-11-17 PROCEDURE — 99213 OFFICE O/P EST LOW 20 MIN: CPT | Performed by: INTERNAL MEDICINE

## 2022-11-17 RX ORDER — ATORVASTATIN CALCIUM 20 MG/1
20 TABLET, FILM COATED ORAL DAILY
Qty: 90 TABLET | Refills: 11 | Status: SHIPPED | OUTPATIENT
Start: 2022-11-17 | End: 2024-01-22

## 2022-11-17 RX ORDER — PANTOPRAZOLE SODIUM 40 MG/1
40 TABLET, DELAYED RELEASE ORAL DAILY
Qty: 90 TABLET | Refills: 11 | Status: SHIPPED | OUTPATIENT
Start: 2022-11-17 | End: 2024-04-04

## 2022-11-17 RX ORDER — LISINOPRIL/HYDROCHLOROTHIAZIDE 10-12.5 MG
1 TABLET ORAL DAILY
Qty: 90 TABLET | Refills: 3 | Status: SHIPPED | OUTPATIENT
Start: 2022-11-17 | End: 2024-01-22

## 2022-11-17 ASSESSMENT — PAIN SCALES - GENERAL: PAINLEVEL: NO PAIN (0)

## 2022-11-17 NOTE — PROGRESS NOTES
"  {PROVIDER CHARTING PREFERENCE:401945}    Subjective   Sarah is a 67 year old{ACCOMPANIED BY STATEMENT (Optional):775403}, presenting for the following health issues:  Recheck Medication      History of Present Illness       Reason for visit:  Refill prescrptions    She eats 0-1 servings of fruits and vegetables daily.She consumes 1 sweetened beverage(s) daily.She exercises with enough effort to increase her heart rate 30 to 60 minutes per day.  She exercises with enough effort to increase her heart rate 7 days per week.   She is taking medications regularly.       {SUPERLIST (Optional):225286}  {additonal problems for provider to add (Optional):751505}    Review of Systems   {ROS COMP (Optional):152086}      Objective    /82 (BP Location: Right arm, Patient Position: Sitting, Cuff Size: Adult Regular)   Pulse 78   Temp 98  F (36.7  C) (Oral)   Resp 18   Ht 1.499 m (4' 11\")   Wt 72.6 kg (160 lb)   SpO2 97%   Breastfeeding No   BMI 32.32 kg/m    Body mass index is 32.32 kg/m .  Physical Exam   {Exam List (Optional):478351}    {Diagnostic Test Results (Optional):620896}    {AMBULATORY ATTESTATION (Optional):474260}            "

## 2022-11-17 NOTE — PROGRESS NOTES
"Assessment/Plan:    Hypertension controlled 128/82.    Hyperlipidemia on statin therapy no history of target organ damage related to same.  Offered lipid panel patient declined.    Obesity discussed the importance of weight loss with caloric restriction regular exercise.  BMI 32.3    Acid reflux without dysphagia or weight loss.  Continue pantoprazole.    15 minutes spent on the date of the encounter doing chart review, patient visit and documentation     Subjective:  Sarah Cisneros is a 67 year old female presents for the following health issues offers no new complaint wants meds refilled.    ROS:  No blood in stool or urine med list reviewed reconciled in the chart.    Objective:  /82 (BP Location: Right arm, Patient Position: Sitting, Cuff Size: Adult Regular)   Pulse 78   Temp 98  F (36.7  C) (Oral)   Resp 18   Ht 1.499 m (4' 11\")   Wt 72.6 kg (160 lb)   SpO2 97%   Breastfeeding No   BMI 32.32 kg/m    Neck veins nondistended no thyromegaly or thyroid nodules no carotid bruits back straight no severe spine tenderness chest clear heart tones normal abdomen benign grieving at the loss of her  1 year ago intracerebral hemorrhage.  On warfarin.  Atrial fibrillation coronary artery disease extremities free of edema cyanosis or clubbing.  She appeared well wears glasses still working.  Helps her therapeutic.    Juan Washington MD  Internal Medicine    Answers for HPI/ROS submitted by the patient on 11/17/2022  What is the reason for your visit today? : refill prescrptions  How many servings of fruits and vegetables do you eat daily?: 0-1  On average, how many sweetened beverages do you drink each day (Examples: soda, juice, sweet tea, etc.  Do NOT count diet or artificially sweetened beverages)?: 1  How many minutes a day do you exercise enough to make your heart beat faster?: 30 to 60  How many days a week do you exercise enough to make your heart beat faster?: 7  How many days per week do " you miss taking your medication?: 0

## 2022-11-21 ENCOUNTER — DOCUMENTATION ONLY (OUTPATIENT)
Dept: OTHER | Facility: CLINIC | Age: 67
End: 2022-11-21

## 2023-01-05 ENCOUNTER — MYC MEDICAL ADVICE (OUTPATIENT)
Dept: INTERNAL MEDICINE | Facility: CLINIC | Age: 68
End: 2023-01-05

## 2023-01-05 DIAGNOSIS — J40 BRONCHITIS: Primary | ICD-10-CM

## 2023-01-05 RX ORDER — AZITHROMYCIN 250 MG/1
TABLET, FILM COATED ORAL
Qty: 6 TABLET | Refills: 0 | Status: SHIPPED | OUTPATIENT
Start: 2023-01-05 | End: 2023-01-10

## 2023-01-20 ENCOUNTER — TELEPHONE (OUTPATIENT)
Dept: INTERNAL MEDICINE | Facility: CLINIC | Age: 68
End: 2023-01-20
Payer: COMMERCIAL

## 2023-01-20 NOTE — TELEPHONE ENCOUNTER
R/s pt to 1/31/23  Advised that if gets worse can be seen in [facility]   Patient verbalized understanding

## 2023-01-20 NOTE — TELEPHONE ENCOUNTER
Reason for Call:  Appointment Request    Patient requesting this type of appt:  Vin[facility]    Requested provider: Juan Washington    Reason patient unable to be scheduled: Not within requested timeframe    When does patient want to be seen/preferred time: 1-2 days    Comments: Patient has a cyst on her back that is painful and she is hoping she doesn't have to wait till 02/14/23 to see her provider     Could we send this information to you in [facility] or would you prefer to receive a phone call?:   Patient would prefer a phone call   Okay to leave a detailed message?: Yes at Home number on file [phone] (home)    Call taken on 1/20/2023 at 7:09 AM by Reyna Chadwick

## 2023-01-23 ENCOUNTER — OFFICE VISIT (OUTPATIENT)
Dept: INTERNAL MEDICINE | Facility: CLINIC | Age: 68
End: 2023-01-23
Payer: COMMERCIAL

## 2023-01-23 VITALS
OXYGEN SATURATION: 96 % | BODY MASS INDEX: 32.32 KG/M2 | HEIGHT: 59 IN | HEART RATE: 89 BPM | TEMPERATURE: 99.4 F | SYSTOLIC BLOOD PRESSURE: 144 MMHG | DIASTOLIC BLOOD PRESSURE: 78 MMHG

## 2023-01-23 DIAGNOSIS — L72.0 RUPTURED EPIDERMAL CYST: Primary | ICD-10-CM

## 2023-01-23 PROCEDURE — 99213 OFFICE O/P EST LOW 20 MIN: CPT | Performed by: NURSE PRACTITIONER

## 2023-01-23 RX ORDER — CEPHALEXIN 500 MG/1
500 CAPSULE ORAL 3 TIMES DAILY
Qty: 30 CAPSULE | Refills: 0 | Status: SHIPPED | OUTPATIENT
Start: 2023-01-23 | End: 2023-02-02

## 2023-01-23 ASSESSMENT — PAIN SCALES - GENERAL: PAINLEVEL: SEVERE PAIN (6)

## 2023-01-23 NOTE — PROGRESS NOTES
"  Assessment & Plan   Problem List Items Addressed This Visit    None  Visit Diagnoses     Ruptured epidermal cyst    -  Primary    Relevant Medications    cephALEXin (KEFLEX) 500 MG capsule    Other Relevant Orders    Adult General Surg Referral         - Spontaneous rupture of inflamed epidermal cyst, reviewed diagnosis with patient. Keratinized material expressed from lesion as able. Advised waiting for complete excision when not inflamed. Referred patient to General Surgery for cyst excision after cell wall has reformed.  - Prescribed cephalexin (KEFLEX) 500 MG capsules to take by mouth 3x per day for 10 days for infection prophylaxis. Educated patient to take antibiotic with food to help reduce GI upset and to take a probiotic or eat yogurt to maintain good gut tim. Discussed with patient to call or return to clinic if s/s of infection such as fever, pus drainage, foul odor, or increased redness/swelling of cyst.      BMI:   Estimated body mass index is 32.32 kg/m  as calculated from the following:    Height as of this encounter: 1.499 m (4' 11\").    Weight as of 11/17/22: 72.6 kg (160 lb).     Return in about 1 week (around 1/30/2023), or if symptoms worsen or fail to improve.    I was present with the NP student, Ney Martinez, who participated in the service and in the documentation of the note. I have verified the history and personally performed the physical exam and medical decision making. I agree with the assessment and plan of care as documented in the note  Alem Hawkins, RAO, APRN, CNP     [facility]    Zach Smith is a 67 year old patient of Dr. Washington with a PMH of HTN, obesity, and HLD presenting for the following health issues:  Derm Problem (Cyst on back)    Patient is here with a c/o a lump on the mid back. She reports she has had a cyst on the mid-back for a long time but 1 week ago it increased in size, started to drain, and became tender when she pressed on " "the area. Denies fever, foul odor, or warmth. Denies previous similar symptoms. Patient reports using peroxide with Band-Aid for treatment with no change in symptoms.     History of Present Illness       Reason for visit:  Cyst on my back  Symptom onset:  3-7 days ago  Symptoms include:  Seeping  Symptom intensity:  Moderate  Symptom progression:  Staying the same  Had these symptoms before:  No  What makes it worse:  No  What makes it better:  No    She eats 2-3 servings of fruits and vegetables daily.She consumes 1 sweetened beverage(s) daily.She exercises with enough effort to increase her heart rate 20 to 29 minutes per day.  She exercises with enough effort to increase her heart rate 7 days per week.   She is taking medications regularly.        Review of Systems   CONSTITUTIONAL:NEGATIVE for fever, chills, change in weight  INTEGUMENTARY/SKIN: POSITIVE for middle back lump with drainage, redness, and tenderness.      Objective    BP (!) 144/78 (BP Location: Right arm, Patient Position: Right side, Cuff Size: Adult Regular)   Pulse 89   Temp 99.4  F (37.4  C) (Temporal)   Ht 1.499 m (4' 11\")   SpO2 96%   Breastfeeding No   BMI 32.32 kg/m    Body mass index is 32.32 kg/m .     Physical Exam   GENERAL: alert and no distress  SKIN:  Mid-back well-circumscribed nodular lesion with central punctum and rupture, located at bra-line. Thick, keratinized, non-odorous drainage is noted when lesion is expressed. No purulent drainage, induration, or warmth noted.   PSYCH: mentation appears normal, affect normal/bright            "

## 2023-01-23 NOTE — PROGRESS NOTES
"  {PROVIDER CHARTING PREFERENCE:195691}    Subjective   Sarah is a 67 year old, presenting for the following health issues:  Derm Problem (Cyst on back)      History of Present Illness       Reason for visit:  Cyst on my back  Symptom onset:  3-7 days ago  Symptoms include:  Seeping  Symptom intensity:  Moderate  Symptom progression:  Staying the same  Had these symptoms before:  No  What makes it worse:  No  What makes it better:  No    She eats 2-3 servings of fruits and vegetables daily.She consumes 1 sweetened beverage(s) daily.She exercises with enough effort to increase her heart rate 20 to 29 minutes per day.  She exercises with enough effort to increase her heart rate 7 days per week.   She is taking medications regularly.           Objective    BP (!) 144/78 (BP Location: Right arm, Patient Position: Right side, Cuff Size: Adult Regular)   Pulse 89   Temp 99.4  F (37.4  C) (Temporal)   Ht 1.499 m (4' 11\")   SpO2 96%   Breastfeeding No   BMI 32.32 kg/m    Body mass index is 32.32 kg/m .  Physical Exam   {Exam List (Optional):320335}                "

## 2023-01-26 ENCOUNTER — OFFICE VISIT (OUTPATIENT)
Dept: SURGERY | Facility: CLINIC | Age: 68
End: 2023-01-26
Payer: COMMERCIAL

## 2023-01-26 VITALS — BODY MASS INDEX: 32.32 KG/M2 | WEIGHT: 160 LBS

## 2023-01-26 DIAGNOSIS — L72.3 INFECTED SEBACEOUS CYST: Primary | ICD-10-CM

## 2023-01-26 DIAGNOSIS — L08.9 INFECTED SEBACEOUS CYST: Primary | ICD-10-CM

## 2023-01-26 PROCEDURE — 99203 OFFICE O/P NEW LOW 30 MIN: CPT | Performed by: SURGERY

## 2023-01-26 NOTE — LETTER
1/26/2023         RE: Sarah Cisneros  [address] [address]        Dear Colleague,    Thank you for referring your patient, Sarah Cisneros, to the [facility] SURGERY [facility]ATRICS [facility] [place]. Please see a copy of my visit note below.    HPI: Sarah Cisneros is a 67 year old female referred to see me by Juan Washington for an infected draining sebaceous cyst on her back.  She notes a nodule in this area for several months prior to becoming inflamed and angry.  Over the past several days it has drained and they have been able to get some additional purulent material out of it.  It is reduced in size over the past 3 days.    Allergies:Patient has no known allergies.    Prior Medical History:   Past Medical History:   Diagnosis Date     Acid reflux      Arthritis      GERD (gastroesophageal reflux disease)      Hematuria      Hypertension      Hypertension      Labyrinthitis      Lyme disease      PONV (postoperative nausea and vomiting)      PONV (postoperative nausea and vomiting)      Shingles     Anterior chest       Prior Surgical History:   Past Surgical History:   Procedure Laterality Date     CYSTOSCOPY, TRANSURETHRAL RESECTION (TUR) TUMOR BLADDER, COMBINED N/A 2/21/2019    Procedure: CYSTOSCOPY, TRANSURETHRAL RESECTION OF BLADDER LESION;  Surgeon: Jesus Moncada MD;  Location: [facility];  Service: Urology     EXCISE SOFT TISSUE TUMOR THIGH Right 11/10/2016    Procedure: EXCISE SOFT TISSUE TUMOR THIGH;  Surgeon: Art Carais MD;  Location:  OR     GYN SURGERY      hysterectomy     HYSTERECTOMY       OOPHORECTOMY       ZZC TOTAL ABDOM HYSTERECTOMY      Description: Total Abdominal Hysterectomy;  Recorded: 07/12/2011;  Annotations: see 12/8/03 pathology report in EHR       CURRENT MEDS:  Prior to Admission medications    Medication Sig Start Date End Date Taking? Authorizing Provider   atorvastatin (LIPITOR) 20 MG tablet Take 1 tablet (20 mg)  by mouth daily 11/17/22  Yes Juan Washington MD   betamethasone dipropionate (DIPROSONE) 0.05 % external lotion  3/9/21  Yes Reported, Patient   cephALEXin (KEFLEX) 500 MG capsule Take 1 capsule (500 mg) by mouth 3 times daily for 10 days 1/23/23 2/2/23 Yes Alem Hawkins, ADIS   lisinopril-hydrochlorothiazide (ZESTORETIC) 10-12.5 MG tablet Take 1 tablet by mouth daily 11/17/22  Yes Juan Washington MD   pantoprazole (PROTONIX) 40 MG EC tablet Take 1 tablet (40 mg) by mouth daily 11/17/22  Yes Juan Washington MD         No family history on file.     reports that she has never smoked. She has been exposed to tobacco smoke. She has never used smokeless tobacco. She reports that she does not drink alcohol and does not use drugs.    History   Smoking Status     Never   Smokeless Tobacco     Never       Review of Systems -    The 10 point review of systems is within normal limits except as noted in HPI.    Wt 72.6 kg (160 lb)   BMI 32.32 kg/m    160 lbs 0 oz  Body mass index is 32.32 kg/m .    EXAM:  GENERAL: Alert, cooperative, appears stated age  SKIN: Inflamed draining abscess on the back with underlying cyst.  Appears to be well drained at the time being without need for additional incision    LABS:  Lab Results   Component Value Date    HGB 13.5 08/12/2021    WBC 9.2 08/12/2021     08/12/2021    AST 22 08/12/2021    ALT 41 08/12/2021    ALKPHOS 96 08/12/2021    BILITOTAL 0.4 08/12/2021    LIPASE 28 01/29/2019           Assessment/Plan:   1. Infected sebaceous cyst          Sarah Cisneros is a 67 year old female with signs and symptoms consistent with an infected and draining sebaceous cyst.  I do not think anything needs to be done to it today.  I explained the rationale and delaying definitive excision until it is no longer inflamed or infected.  We will have her come back in several weeks time for excision in clinic with local anesthetic only.    15 minutes spent on the date of the encounter  doing chart review, patient visit and documentation    Keo Wolf MD ,MD  [facility] Department of Surgery      Again, thank you for allowing me to participate in the care of your patient.        Sincerely,        Keo Wolf MD

## 2023-01-31 NOTE — PROGRESS NOTES
HPI: Sarah Cisneros is a 67 year old female referred to see me by Juan Washington for an infected draining sebaceous cyst on her back.  She notes a nodule in this area for several months prior to becoming inflamed and angry.  Over the past several days it has drained and they have been able to get some additional purulent material out of it.  It is reduced in size over the past 3 days.    Allergies:Patient has no known allergies.    Prior Medical History:   Past Medical History:   Diagnosis Date     Acid reflux      Arthritis      GERD (gastroesophageal reflux disease)      Hematuria      Hypertension      Hypertension      Labyrinthitis      Lyme disease      PONV (postoperative nausea and vomiting)      PONV (postoperative nausea and vomiting)      Shingles     Anterior chest       Prior Surgical History:   Past Surgical History:   Procedure Laterality Date     CYSTOSCOPY, TRANSURETHRAL RESECTION (TUR) TUMOR BLADDER, COMBINED N/A 2/21/2019    Procedure: CYSTOSCOPY, TRANSURETHRAL RESECTION OF BLADDER LESION;  Surgeon: Jesus Moncada MD;  Location: [facility];  Service: Urology     EXCISE SOFT TISSUE TUMOR THIGH Right 11/10/2016    Procedure: EXCISE SOFT TISSUE TUMOR THIGH;  Surgeon: Art Carias MD;  Location:  OR     GYN SURGERY      hysterectomy     HYSTERECTOMY       OOPHORECTOMY       [facility] TOTAL ABDOM HYSTERECTOMY      Description: Total Abdominal Hysterectomy;  Recorded: 07/12/2011;  Annotations: see 12/8/03 pathology report in EHR       CURRENT MEDS:  Prior to Admission medications    Medication Sig Start Date End Date Taking? Authorizing Provider   atorvastatin (LIPITOR) 20 MG tablet Take 1 tablet (20 mg) by mouth daily 11/17/22  Yes Juan Washington MD   betamethasone dipropionate (DIPROSONE) 0.05 % external lotion  3/9/21  Yes Reported, Patient   cephALEXin (KEFLEX) 500 MG capsule Take 1 capsule (500 mg) by mouth 3 times daily for 10 days 1/23/23 2/2/23 Yes Alem Hawkins NP    lisinopril-hydrochlorothiazide (ZESTORETIC) 10-12.5 MG tablet Take 1 tablet by mouth daily 11/17/22  Yes Juan Washington MD   pantoprazole (PROTONIX) 40 MG EC tablet Take 1 tablet (40 mg) by mouth daily 11/17/22  Yes Juan Washington MD         No family history on file.     reports that she has never smoked. She has been exposed to tobacco smoke. She has never used smokeless tobacco. She reports that she does not drink alcohol and does not use drugs.    History   Smoking Status     Never   Smokeless Tobacco     Never       Review of Systems -    The 10 point review of systems is within normal limits except as noted in HPI.    Wt 72.6 kg (160 lb)   BMI 32.32 kg/m    160 lbs 0 oz  Body mass index is 32.32 kg/m .    EXAM:  GENERAL: Alert, cooperative, appears stated age  SKIN: Inflamed draining abscess on the back with underlying cyst.  Appears to be well drained at the time being without need for additional incision    LABS:  Lab Results   Component Value Date    HGB 13.5 08/12/2021    WBC 9.2 08/12/2021     08/12/2021    AST 22 08/12/2021    ALT 41 08/12/2021    ALKPHOS 96 08/12/2021    BILITOTAL 0.4 08/12/2021    LIPASE 28 01/29/2019           Assessment/Plan:   1. Infected sebaceous cyst          Sarah Cisneros is a 67 year old female with signs and symptoms consistent with an infected and draining sebaceous cyst.  I do not think anything needs to be done to it today.  I explained the rationale and delaying definitive excision until it is no longer inflamed or infected.  We will have her come back in several weeks time for excision in clinic with local anesthetic only.    15 minutes spent on the date of the encounter doing chart review, patient visit and documentation    Keo Wolf MD ,MD  [facility] Department of Surgery

## 2023-02-17 ENCOUNTER — OFFICE VISIT (OUTPATIENT)
Dept: SURGERY | Facility: CLINIC | Age: 68
End: 2023-02-17
Payer: COMMERCIAL

## 2023-02-17 VITALS
BODY MASS INDEX: 32.25 KG/M2 | WEIGHT: 160 LBS | SYSTOLIC BLOOD PRESSURE: 136 MMHG | HEIGHT: 59 IN | DIASTOLIC BLOOD PRESSURE: 70 MMHG

## 2023-02-17 DIAGNOSIS — L72.3 SEBACEOUS CYST: Primary | ICD-10-CM

## 2023-02-17 PROCEDURE — 11403 EXC TR-EXT B9+MARG 2.1-3CM: CPT | Performed by: SURGERY

## 2023-02-17 PROCEDURE — 88304 TISSUE EXAM BY PATHOLOGIST: CPT | Performed by: PATHOLOGY

## 2023-02-17 NOTE — LETTER
2/17/2023         RE: Sarah Cisneros  [address] [address]        Dear Colleague,    Thank you for referring your patient, Sarah Cisneros, to the [facility] SURGERY CLINIC AND BARIATRICS [facility]. Please see a copy of my visit note below.    See procedure note      Again, thank you for allowing me to participate in the care of your patient.        Sincerely,        Keo Wolf MD

## 2023-02-17 NOTE — PROCEDURES
The skin overlying the mid back sebaceous cyst was prepped alcohol.  10 cc of local anesthetic was then injected.  The skin was then reprepped with Betadine.  He was then draped in sterile fashion.  A 1 x 3 cm elliptical incision was then made in the skin to include the underlying cyst.  We sharply dissected down to the underlying subcutaneous fatty tissues and after ensuring the entire cyst was contained in her surgical specimen specimen was excised off the back and sent to pathology for evaluation.  After ensuring hemostasis, the wound was closed with interrupted 3-0 Vicryl sutures and Dermabond.    Keo Wolf MD, FACS  [phone]  [facility]  General and Bariatric Surgery

## 2023-02-20 ENCOUNTER — TELEPHONE (OUTPATIENT)
Dept: NURSING | Facility: CLINIC | Age: 68
End: 2023-02-20
Payer: COMMERCIAL

## 2023-02-20 LAB
PATH REPORT.COMMENTS IMP SPEC: NORMAL
PATH REPORT.COMMENTS IMP SPEC: NORMAL
PATH REPORT.FINAL DX SPEC: NORMAL
PATH REPORT.GROSS SPEC: NORMAL
PATH REPORT.MICROSCOPIC SPEC OTHER STN: NORMAL
PATH REPORT.RELEVANT HX SPEC: NORMAL
PHOTO IMAGE: NORMAL

## 2023-02-20 NOTE — TELEPHONE ENCOUNTER
Telephone call  Patient calling to have a appointment to have staples removed she is scheduled to get them removed Thursday and because of the weather wants to get it done today or tomorrow. Transferred her to the Medical assistant at [facility] for a appointment.    Inés Elder RN   [facility] Nurse Advisor  11:56 AM 2/20/2023

## 2023-02-21 ENCOUNTER — ALLIED HEALTH/NURSE VISIT (OUTPATIENT)
Dept: FAMILY MEDICINE | Facility: CLINIC | Age: 68
End: 2023-02-21
Payer: COMMERCIAL

## 2023-02-21 DIAGNOSIS — Z48.02: Primary | ICD-10-CM

## 2023-02-21 PROCEDURE — 99207 PR NO CHARGE NURSE ONLY: CPT

## 2023-02-21 NOTE — PROGRESS NOTES
Sarah Cisneros presents to the clinic for removal of staples. The patient has had sutures and staples in place for 12 days. There has been no patient reported signs or symptoms of infection or drainage. 7  staples are seen and located on the posterior scalp. Tetanus status is up to date. All staples were easily removed today. Routine wound care discussed by the RN or provider. The patient will follow up as needed.

## 2023-05-06 ENCOUNTER — HEALTH MAINTENANCE LETTER (OUTPATIENT)
Age: 68
End: 2023-05-06

## 2023-07-18 DIAGNOSIS — I10 ESSENTIAL HYPERTENSION: ICD-10-CM

## 2023-07-19 RX ORDER — LISINOPRIL/HYDROCHLOROTHIAZIDE 10-12.5 MG
1 TABLET ORAL DAILY
Qty: 90 TABLET | Refills: 3 | OUTPATIENT
Start: 2023-07-19

## 2023-07-19 NOTE — TELEPHONE ENCOUNTER
"Routing refill request to provider for review/approval because:  Labs not current:  8/12/2021  To early for refill  Should have meds until november    Last Written Prescription Date:  11/17/2022  Last Fill Quantity: 90,  # refills: 3   Last office visit provider:  1/23/2023     Requested Prescriptions   Pending Prescriptions Disp Refills     lisinopril-hydrochlorothiazide (ZESTORETIC) 10-12.5 MG tablet 90 tablet 3     Sig: Take 1 tablet by mouth daily       Diuretics (Including Combos) Protocol Failed - 7/19/2023 11:31 AM        Failed - Normal serum creatinine on file in past 12 months     Recent Labs   Lab Test 08/12/21  1559   CR 0.87              Failed - Normal serum potassium on file in past 12 months     Recent Labs   Lab Test 08/12/21  1559   POTASSIUM 4.5                    Failed - Normal serum sodium on file in past 12 months     Recent Labs   Lab Test 08/12/21  1559                 Passed - Blood pressure under 140/90 in past 12 months     BP Readings from Last 3 Encounters:   02/17/23 136/70   01/23/23 (!) 144/78   11/17/22 128/82                 Passed - Recent (12 mo) or future (30 days) visit within the authorizing provider's specialty     Patient has had an office visit with the authorizing provider or a provider within the authorizing providers department within the previous 12 mos or has a future within next 30 days. See \"Patient Info\" tab in inbasket, or \"Choose Columns\" in Meds & Orders section of the refill encounter.              Passed - Medication is active on med list        Passed - Patient is age 18 or older        Passed - No active pregancy on record        Passed - No positive pregnancy test in past 12 months       ACE Inhibitors (Including Combos) Protocol Failed - 7/19/2023 11:31 AM        Failed - Normal serum creatinine on file in past 12 months     Recent Labs   Lab Test 08/12/21  1559   CR 0.87       Ok to refill medication if creatinine is low          Failed - Normal " "serum potassium on file in past 12 months     Recent Labs   Lab Test 08/12/21  1559   POTASSIUM 4.5             Passed - Blood pressure under 140/90 in past 12 months     BP Readings from Last 3 Encounters:   02/17/23 136/70   01/23/23 (!) 144/78   11/17/22 128/82                 Passed - Recent (12 mo) or future (30 days) visit within the authorizing provider's specialty     Patient has had an office visit with the authorizing provider or a provider within the authorizing providers department within the previous 12 mos or has a future within next 30 days. See \"Patient Info\" tab in inbasket, or \"Choose Columns\" in Meds & Orders section of the refill encounter.              Passed - Medication is active on med list        Passed - Patient is age 18 or older        Passed - No active pregnancy on record        Passed - No positive pregnancy test within past 12 months             Swathi Elder RN 07/19/23 11:31 AM  "

## 2023-07-28 ENCOUNTER — MYC REFILL (OUTPATIENT)
Dept: INTERNAL MEDICINE | Facility: CLINIC | Age: 68
End: 2023-07-28
Payer: COMMERCIAL

## 2023-07-28 DIAGNOSIS — I10 ESSENTIAL HYPERTENSION: Primary | ICD-10-CM

## 2023-07-28 RX ORDER — BETAMETHASONE DIPROPIONATE 0.5 MG/G
LOTION TOPICAL DAILY
Qty: 60 ML | Refills: 3 | Status: SHIPPED | OUTPATIENT
Start: 2023-07-28

## 2023-07-28 NOTE — TELEPHONE ENCOUNTER
Routing refill request to provider for review/approval because:  Drug not on the [facility] refill protocol   A break in medication  Pt reported    Last Written Prescription Date:  8/12/21  Last Fill Quantity: NA,  # refills: NA   Last office visit provider:   1/23/23    Requested Prescriptions   Pending Prescriptions Disp Refills    betamethasone dipropionate (DIPROSONE) 0.05 % external lotion         There is no refill protocol information for this order          Rakel Alvarez RN 07/28/23 2:55 PM

## 2023-07-29 ENCOUNTER — HEALTH MAINTENANCE LETTER (OUTPATIENT)
Age: 68
End: 2023-07-29

## 2024-01-17 ENCOUNTER — MYC MEDICAL ADVICE (OUTPATIENT)
Dept: INTERNAL MEDICINE | Facility: CLINIC | Age: 69
End: 2024-01-17
Payer: COMMERCIAL

## 2024-01-18 NOTE — TELEPHONE ENCOUNTER
Spoke to patient, states she fell on 1/14/2024 but doesn't remember falling isn't sure if she just missed the step, then did have vomiting episode- EMS called & took to [place].  CT scan negative.  Diagnosed with concussion.  Patient has had mild headaches since fall, tailbone is sore but no other symptoms. Denies any confusion, worsening headaches, changes in behavior, slurred speech, weakness, imbalance.  Granddaughter there with her today & states she seems the same as always.     Scheduled for appt with Dr Washington on Tuesday 1/23/2024 to discuss headaches.   Sent information regarding care for concussions to [facility]  Advised to call back if any changes in symptoms

## 2024-01-22 DIAGNOSIS — I10 ESSENTIAL HYPERTENSION: ICD-10-CM

## 2024-01-22 RX ORDER — LISINOPRIL/HYDROCHLOROTHIAZIDE 10-12.5 MG
1 TABLET ORAL DAILY
Qty: 90 TABLET | Refills: 3 | Status: SHIPPED | OUTPATIENT
Start: 2024-01-22

## 2024-01-22 RX ORDER — ATORVASTATIN CALCIUM 20 MG/1
20 TABLET, FILM COATED ORAL DAILY
Qty: 90 TABLET | Refills: 11 | Status: SHIPPED | OUTPATIENT
Start: 2024-01-22

## 2024-01-23 ENCOUNTER — OFFICE VISIT (OUTPATIENT)
Dept: INTERNAL MEDICINE | Facility: CLINIC | Age: 69
End: 2024-01-23
Payer: COMMERCIAL

## 2024-01-23 VITALS
OXYGEN SATURATION: 99 % | RESPIRATION RATE: 12 BRPM | TEMPERATURE: 98.1 F | SYSTOLIC BLOOD PRESSURE: 138 MMHG | BODY MASS INDEX: 32.17 KG/M2 | DIASTOLIC BLOOD PRESSURE: 70 MMHG | HEIGHT: 59 IN | WEIGHT: 159.6 LBS | HEART RATE: 87 BPM

## 2024-01-23 DIAGNOSIS — E66.01 MORBID OBESITY (H): ICD-10-CM

## 2024-01-23 DIAGNOSIS — Z12.31 VISIT FOR SCREENING MAMMOGRAM: ICD-10-CM

## 2024-01-23 DIAGNOSIS — Z12.11 SCREEN FOR COLON CANCER: ICD-10-CM

## 2024-01-23 DIAGNOSIS — S06.0X1D CONCUSSION WITH LOSS OF CONSCIOUSNESS OF 30 MINUTES OR LESS, SUBSEQUENT ENCOUNTER: Primary | ICD-10-CM

## 2024-01-23 DIAGNOSIS — I10 ESSENTIAL HYPERTENSION: ICD-10-CM

## 2024-01-23 DIAGNOSIS — R31.29 MICROSCOPIC HEMATURIA: Primary | ICD-10-CM

## 2024-01-23 LAB
ALBUMIN SERPL BCG-MCNC: 5 G/DL (ref 3.5–5.2)
ALBUMIN UR-MCNC: NEGATIVE MG/DL
ALP SERPL-CCNC: 93 U/L (ref 40–150)
ALT SERPL W P-5'-P-CCNC: 18 U/L (ref 0–50)
ANION GAP SERPL CALCULATED.3IONS-SCNC: 13 MMOL/L (ref 7–15)
APPEARANCE UR: CLEAR
AST SERPL W P-5'-P-CCNC: 20 U/L (ref 0–45)
BACTERIA #/AREA URNS HPF: ABNORMAL /HPF
BILIRUB SERPL-MCNC: 0.4 MG/DL
BILIRUB UR QL STRIP: NEGATIVE
BUN SERPL-MCNC: 9.6 MG/DL (ref 8–23)
CALCIUM SERPL-MCNC: 9.9 MG/DL (ref 8.8–10.2)
CHLORIDE SERPL-SCNC: 93 MMOL/L (ref 98–107)
CHOLEST SERPL-MCNC: 200 MG/DL
COLOR UR AUTO: YELLOW
CREAT SERPL-MCNC: 0.77 MG/DL (ref 0.51–0.95)
DEPRECATED HCO3 PLAS-SCNC: 29 MMOL/L (ref 22–29)
EGFRCR SERPLBLD CKD-EPI 2021: 85 ML/MIN/1.73M2
ERYTHROCYTE [DISTWIDTH] IN BLOOD BY AUTOMATED COUNT: 12.3 % (ref 10–15)
FASTING STATUS PATIENT QL REPORTED: YES
GLUCOSE SERPL-MCNC: 103 MG/DL (ref 70–99)
GLUCOSE UR STRIP-MCNC: NEGATIVE MG/DL
HCT VFR BLD AUTO: 39.3 % (ref 35–47)
HDLC SERPL-MCNC: 72 MG/DL
HGB BLD-MCNC: 13.9 G/DL (ref 11.7–15.7)
HGB UR QL STRIP: ABNORMAL
KETONES UR STRIP-MCNC: ABNORMAL MG/DL
LDLC SERPL CALC-MCNC: 95 MG/DL
LEUKOCYTE ESTERASE UR QL STRIP: NEGATIVE
MCH RBC QN AUTO: 31.4 PG (ref 26.5–33)
MCHC RBC AUTO-ENTMCNC: 35.4 G/DL (ref 31.5–36.5)
MCV RBC AUTO: 89 FL (ref 78–100)
NITRATE UR QL: NEGATIVE
NONHDLC SERPL-MCNC: 128 MG/DL
PH UR STRIP: 5.5 [PH] (ref 5–8)
PLATELET # BLD AUTO: 449 10E3/UL (ref 150–450)
POTASSIUM SERPL-SCNC: 3.4 MMOL/L (ref 3.4–5.3)
PROT SERPL-MCNC: 7.8 G/DL (ref 6.4–8.3)
RBC # BLD AUTO: 4.43 10E6/UL (ref 3.8–5.2)
RBC #/AREA URNS AUTO: ABNORMAL /HPF
SODIUM SERPL-SCNC: 135 MMOL/L (ref 135–145)
SP GR UR STRIP: <=1.005 (ref 1–1.03)
SQUAMOUS #/AREA URNS AUTO: ABNORMAL /LPF
TRIGL SERPL-MCNC: 166 MG/DL
TSH SERPL DL<=0.005 MIU/L-ACNC: 2.85 UIU/ML (ref 0.3–4.2)
UROBILINOGEN UR STRIP-ACNC: 0.2 E.U./DL
WBC # BLD AUTO: 7.7 10E3/UL (ref 4–11)
WBC #/AREA URNS AUTO: ABNORMAL /HPF

## 2024-01-23 PROCEDURE — 99214 OFFICE O/P EST MOD 30 MIN: CPT | Performed by: INTERNAL MEDICINE

## 2024-01-23 PROCEDURE — 36415 COLL VENOUS BLD VENIPUNCTURE: CPT | Performed by: INTERNAL MEDICINE

## 2024-01-23 PROCEDURE — 80061 LIPID PANEL: CPT | Performed by: INTERNAL MEDICINE

## 2024-01-23 PROCEDURE — 81001 URINALYSIS AUTO W/SCOPE: CPT | Performed by: INTERNAL MEDICINE

## 2024-01-23 PROCEDURE — 80053 COMPREHEN METABOLIC PANEL: CPT | Performed by: INTERNAL MEDICINE

## 2024-01-23 PROCEDURE — 84443 ASSAY THYROID STIM HORMONE: CPT | Performed by: INTERNAL MEDICINE

## 2024-01-23 PROCEDURE — 85027 COMPLETE CBC AUTOMATED: CPT | Performed by: INTERNAL MEDICINE

## 2024-01-23 RX ORDER — RESPIRATORY SYNCYTIAL VIRUS VACCINE 120MCG/0.5
0.5 KIT INTRAMUSCULAR ONCE
Qty: 1 EACH | Refills: 0 | Status: CANCELLED | OUTPATIENT
Start: 2024-01-23 | End: 2024-01-23

## 2024-01-23 NOTE — PROGRESS NOTES
"  {PROVIDER CHARTING PREFERENCE:835310}    Subjective   Sarah is a 68 year old, presenting for the following health issues:  Hospital F/U (On 1/14/24)        1/23/2024    10:01 AM   Additional Questions   Roomed by Bharath GOMES     {MA/LPN/RN Pre-Provider Visit Orders- hCG/UA/Strep (Optional):890556}  ED/UC Followup:    Facility:  [facility]  Date of visit: 1/14/24  Reason for visit: fall  Current Status: getting better and headaches have gone away  {additonal problems for provider to add (Optional):280693}    {ROS Picklists (Optional):639767}      Objective    /70   Pulse 87   Temp 98.1  F (36.7  C) (Oral)   Resp 12   Ht 1.499 m (4' 11\")   Wt 72.4 kg (159 lb 9.6 oz)   SpO2 99%   BMI 32.24 kg/m    Body mass index is 32.24 kg/m .  Physical Exam   {Exam List (Optional):203402}    {Diagnostic Test Results (Optional):546054}        Signed Electronically by: Juan Washington MD  {Email feedback regarding this note to [email or web address]   :806494}  "

## 2024-01-23 NOTE — PROGRESS NOTES
"Assessment/Plan:    Concussion after fall January 14, 2024 on a step at home.  Seen in the emergency room [facility].  Same day.  CT head negative.  Wants labs done.  Ordered hemogram comprehensive metabolic profile lipid panel TSH urinalysis.  We had a good discussion.  Retrograde and anterograde amnesia for a number of minutes.  Offered neurology and cardiology consults patient declined.    Hypertension controlled 138/70 same meds to be continued including lisinopril HCTZ 10/12.5 daily.    Hyperlipidemia on statin therapy 20 mg daily no history of target organ damage related to same.    Morbid obesity with BMI 32+ discussed the importance of weight loss with caloric restriction regular exercise.    25 minutes spent on the date of the encounter doing chart review, patient visit, and documentation     Subjective:  Sarah Cisneros is a 68 year old female presents for the following health issues missed a step on the stool no associated chest pain or shortness of breath fell from about a 5 foot stool step head trauma with amnesia.  Concussion.  CT head done [facility] same day 1/14/2024 all clear no associated chest pain palpitations shortness of breath no prior history of syncope.  Offered neurology and cardiology consult.  Patient declined.  Lost her balance she thinks.  Cobweb hunting.  Taken by the ambulance to [facility] emergency department associated symptom includes vomiting.    ROS:  No blood in stool or urine no chest pain shortness of breath med list reviewed reconciled.    Objective:  /70   Pulse 87   Temp 98.1  F (36.7  C) (Oral)   Resp 12   Ht 1.499 m (4' 11\")   Wt 72.4 kg (159 lb 9.6 oz)   SpO2 99%   BMI 32.24 kg/m    No localizing neurologic findings no carotid bruits left or right the neck was supple good range of motion thyroid not enlarged no thyroid nodules chest clear heart tones normal without murmur rhythm is regular.  Rate was 90 abdomen mild centripetal obesity noted " BMI 32 extremities free of edema cyanosis or clubbing her color is good short stature.  Speech and language function intact not in acute distress or toxic skin was warm and dry she appeared well articulate.    Juan Washington MD  Internal Medicine    Answers submitted by the patient for this visit:  General Questionnaire (Submitted on 1/23/2024)  Chief Complaint: Chronic problems general questions HPI Form  What is the reason for your visit today? : Follow up from the ER on 1/14/24  How many servings of fruits and vegetables do you eat daily?: 0-1  On average, how many sweetened beverages do you drink each day (Examples: soda, juice, sweet tea, etc.  Do NOT count diet or artificially sweetened beverages)?: 1  How many minutes a day do you exercise enough to make your heart beat faster?: 20 to 29  How many days a week do you exercise enough to make your heart beat faster?: 7  How many days per week do you miss taking your medication?: 0

## 2024-01-30 ENCOUNTER — MYC MEDICAL ADVICE (OUTPATIENT)
Dept: INTERNAL MEDICINE | Facility: CLINIC | Age: 69
End: 2024-01-30
Payer: COMMERCIAL

## 2024-01-30 DIAGNOSIS — R55 SYNCOPE, UNSPECIFIED SYNCOPE TYPE: Primary | ICD-10-CM

## 2024-03-20 ENCOUNTER — OFFICE VISIT (OUTPATIENT)
Dept: FAMILY MEDICINE | Facility: CLINIC | Age: 69
End: 2024-03-20
Payer: COMMERCIAL

## 2024-03-20 VITALS
OXYGEN SATURATION: 100 % | RESPIRATION RATE: 16 BRPM | HEART RATE: 83 BPM | TEMPERATURE: 97.9 F | DIASTOLIC BLOOD PRESSURE: 83 MMHG | BODY MASS INDEX: 33.41 KG/M2 | SYSTOLIC BLOOD PRESSURE: 163 MMHG | WEIGHT: 165.4 LBS

## 2024-03-20 DIAGNOSIS — H61.23 BILATERAL IMPACTED CERUMEN: Primary | ICD-10-CM

## 2024-03-20 PROCEDURE — 69209 REMOVE IMPACTED EAR WAX UNI: CPT | Mod: 50 | Performed by: STUDENT IN AN ORGANIZED HEALTH CARE EDUCATION/TRAINING PROGRAM

## 2024-03-20 NOTE — PROGRESS NOTES
Assessment & Plan     Bilateral impacted cerumen  - OK REMOVAL IMPACTED CERUMEN IRRIGATION/LVG UNILAT       Bilateral impacted cerumen on exam, irrigated and removed by MA. No signs of infection, hearing improved.     No follow-ups on file.    Josephine Montoya, DO  she/her  [facility]    Subjective     Sarah Cisneros is a 68 year old female who presents to clinic today for the following health issues:    HPI    [place] that her ears were plugged yesterday, left more than right so used an eardrop overnight.  Then used a Q-tip and thinks it got blocked again  No pain or pressure bilaterally  Denies fever  Uses Debrox drops at home intermittently  Has had 2 falls in the last year so wondering if blocked ears are affecting her balance  Has had cerumen irrigation once or twice in her life before    Thank you  Past Medical History:   Diagnosis Date    Acid reflux     Arthritis     GERD (gastroesophageal reflux disease)     Hematuria     Hypertension     Hypertension     Labyrinthitis     Lyme disease     PONV (postoperative nausea and vomiting)     PONV (postoperative nausea and vomiting)     Shingles     Anterior chest     No Known Allergies  Current Outpatient Medications   Medication    atorvastatin (LIPITOR) 20 MG tablet    betamethasone dipropionate (DIPROSONE) 0.05 % external lotion    lisinopril-hydrochlorothiazide (ZESTORETIC) 10-12.5 MG tablet    pantoprazole (PROTONIX) 40 MG EC tablet     No current facility-administered medications for this visit.       Review of Systems  Constitutional, HEENT, cardiovascular, pulmonary, gi and gu systems are negative, except as otherwise noted.      Objective    BP (!) 163/83   Pulse 83   Temp 97.9  F (36.6  C) (Oral)   Resp 16   Wt 75 kg (165 lb 6.4 oz)   SpO2 100%   BMI 33.41 kg/m      Physical Exam   General: Alert and oriented, in no acute distress.  Skin: Warm and dry, no abnormalities noted.  Eyes: Extra-ocular muscles intact, pupils equal and  reactive.  ENT: bilateral impacted cerumen; once irrigated, TM clear without bulging or erythema  CV: No cyanosis or pallor, warm and well perfused.  Respiratory: No respiratory distress, no accessory muscle use.  Neuro: Gait and station normal, comprehension intact. Gross and fine motor skills intact.   Psychiatric: Mood and affect appear normal.   Extremities: Warm, able to move all four extremities at will.      The use of Dragon/coJuvo dictation services may have been used to construct the content in this note; any grammatical or spelling errors are non-intentional. Please contact the author of this note directly if you are in need of any clarification.

## 2024-04-04 ENCOUNTER — MYC MEDICAL ADVICE (OUTPATIENT)
Dept: INTERNAL MEDICINE | Facility: CLINIC | Age: 69
End: 2024-04-04
Payer: COMMERCIAL

## 2024-04-04 ENCOUNTER — MYC REFILL (OUTPATIENT)
Dept: INTERNAL MEDICINE | Facility: CLINIC | Age: 69
End: 2024-04-04
Payer: COMMERCIAL

## 2024-04-04 DIAGNOSIS — I10 ESSENTIAL HYPERTENSION: ICD-10-CM

## 2024-04-04 RX ORDER — PANTOPRAZOLE SODIUM 40 MG/1
40 TABLET, DELAYED RELEASE ORAL DAILY
Qty: 90 TABLET | Refills: 11 | OUTPATIENT
Start: 2024-04-04

## 2024-04-04 RX ORDER — PANTOPRAZOLE SODIUM 40 MG/1
40 TABLET, DELAYED RELEASE ORAL DAILY
Qty: 90 TABLET | Refills: 11 | Status: SHIPPED | OUTPATIENT
Start: 2024-04-04

## 2024-09-10 ENCOUNTER — OFFICE VISIT (OUTPATIENT)
Dept: INTERNAL MEDICINE | Facility: CLINIC | Age: 69
End: 2024-09-10
Payer: COMMERCIAL

## 2024-09-10 VITALS
DIASTOLIC BLOOD PRESSURE: 82 MMHG | TEMPERATURE: 98.2 F | WEIGHT: 163 LBS | BODY MASS INDEX: 32.86 KG/M2 | HEART RATE: 82 BPM | RESPIRATION RATE: 16 BRPM | SYSTOLIC BLOOD PRESSURE: 160 MMHG | HEIGHT: 59 IN | OXYGEN SATURATION: 98 %

## 2024-09-10 DIAGNOSIS — I10 ESSENTIAL HYPERTENSION: ICD-10-CM

## 2024-09-10 DIAGNOSIS — F41.9 ANXIETY: ICD-10-CM

## 2024-09-10 DIAGNOSIS — E66.01 MORBID OBESITY (H): Primary | ICD-10-CM

## 2024-09-10 PROCEDURE — 99214 OFFICE O/P EST MOD 30 MIN: CPT | Performed by: INTERNAL MEDICINE

## 2024-09-10 PROCEDURE — G2211 COMPLEX E/M VISIT ADD ON: HCPCS | Performed by: INTERNAL MEDICINE

## 2024-09-10 RX ORDER — ESCITALOPRAM OXALATE 10 MG/1
10 TABLET ORAL DAILY
Qty: 90 TABLET | Refills: 11 | Status: SHIPPED | OUTPATIENT
Start: 2024-09-10

## 2024-09-10 RX ORDER — CLOBETASOL PROPIONATE 0.5 MG/ML
SOLUTION TOPICAL
COMMUNITY
Start: 2024-08-30

## 2024-09-10 RX ORDER — CLOBETASOL PROPIONATE 0.5 MG/G
CREAM TOPICAL
COMMUNITY
Start: 2024-08-30

## 2024-09-10 ASSESSMENT — PAIN SCALES - GENERAL: PAINLEVEL: NO PAIN (0)

## 2024-09-10 NOTE — PROGRESS NOTES
Assessment/Plan:    (E66.01) Morbid obesity (H)  (primary encounter diagnosis)  Comment: BMI is elevated at 33 needs to lose weight blood pressure is elevated part of this.  Plan: Fewer calories intake and more exercise like walking will help lower weight.  Losing weight will help the blood pressure.  Which is mildly elevated.    (I10) Essential hypertension  Comment: Blood pressure is mildly elevated at 160/82.  She does have morbid obesity with BMI of 33 losing weight will help lower the blood pressure.  Exercising more with few calories and will also benefit.  Continue lisinopril HCTZ 10.5 advised salt restriction and diet as well.  Plan: Weight loss will help plus avoiding salt in the diet to help lower the blood pressure continue lisinopril HCTZ 10/12.51 daily.  Recheck patient in 4 months time to terms of the blood pressure.    Hyperlipidemia on statin therapy primary prevention stable continue Lipitor same with next annual wellness visit or physical examination will check lipid panel.    Acid reflux disease better with Protonix.  Continue same meds.    Anxiety after losing her  3 years ago.  He  of a sudden cardiac death and family has suggested that an antianxiety medication may be beneficial.  Plan try low-dose Lexapro 10 mg daily discussed potential side effects and how to use it 10 mg once daily.  Recheck 4 months time.        25 minutes spent on the date of the encounter doing chart review, review of outside records, patient visit, and documentation     Subjective:  Sarah Cisneros is a 69 year old female presents for the following health issues BMI is elevated as his blood pressure.  Patient needs something for anxiety according to her family.  Try low-dose Lexapro 10 mg daily.  Blood pressure is elevated add salt restriction and diet with weight loss should help BMI is elevated as well at 33 discussed the importance of weight loss with caloric restriction regular exercise.    ROS:  No  "chest pain or shortness of breath no blood in stool or urine.    Objective:  BP (!) 160/82 (BP Location: Right arm, Patient Position: Sitting, Cuff Size: Adult Regular)   Pulse 82   Temp 98.2  F (36.8  C) (Oral)   Resp 16   Ht 1.499 m (4' 11\")   Wt 73.9 kg (163 lb)   SpO2 98%   BMI 32.92 kg/m    Recheck blood pressure is 162/84 right arm sitting.  Chest clear heart tones normal abdomen obese no abdominal bruits audible extremities are free of edema cyanosis or clubbing neck veins nondistended no thyromegaly no thyroid nodules no carotid bruits.  We had a good discussion and a good examination today return to clinic in 4 months time.    Juan Washington MD  Internal Medicine    The longitudinal plan of care for the diagnosis(es)/condition(s) as documented were addressed during this visit. Due to the added complexity in care, I will continue to support Sarah in the subsequent management and with ongoing continuity of care.      Answers submitted by the patient for this visit:  Hypertension Visit (Submitted on 9/10/2024)  Chief Complaint: Chronic problems general questions HPI Form  Do you check your blood pressure regularly outside of the clinic?: No  Are your blood pressures ever more than 140 on the top number (systolic) OR more than 90 on the bottom number (diastolic)? (For example, greater than 140/90): Yes  Are you following a low salt diet?: No  General Questionnaire (Submitted on 9/10/2024)  Chief Complaint: Chronic problems general questions HPI Form  How many servings of fruits and vegetables do you eat daily?: 0-1  On average, how many sweetened beverages do you drink each day (Examples: soda, juice, sweet tea, etc.  Do NOT count diet or artificially sweetened beverages)?: 1  How many minutes a day do you exercise enough to make your heart beat faster?: 20 to 29  How many days a week do you exercise enough to make your heart beat faster?: 7  How many days per week do you miss taking your " medication?: 0

## 2024-09-10 NOTE — PROGRESS NOTES
{PROVIDER CHARTING PREFERENCE:474762}    Subjective   Sarah is a 69 year old, presenting for the following health issues:  Hypertension (Blood pressure follow-up. /Patient state she would like to get the Flu vaccine. )        9/10/2024    10:01 AM   Additional Questions   Roomed by Geovanny CRABTREE MA     History of Present Illness       Hypertension: She presents for follow up of hypertension.  She does not check blood pressure  regularly outside of the clinic. Outside blood pressures have been over 140/90. She does not follow a low salt diet.     She eats 0-1 servings of fruits and vegetables daily.She consumes 1 sweetened beverage(s) daily.She exercises with enough effort to increase her heart rate 20 to 29 minutes per day.  She exercises with enough effort to increase her heart rate 7 days per week.   She is taking medications regularly.       {MA/LPN/RN Pre-Provider Visit Orders- hCG/UA/Strep (Optional):837422}  Hypertension Follow-up    Do you check your blood pressure regularly outside of the clinic? No   Are you following a low salt diet? No  Are your blood pressures ever more than 140 on the top number (systolic) OR more   than 90 on the bottom number (diastolic), for example 140/90? Yes  How many servings of fruits and vegetables do you eat daily?  0-1  On average, how many sweetened beverages do you drink each day (Examples: soda, juice, sweet tea, etc.  Do NOT count diet or artificially sweetened beverages)?   1  How many days per week do you exercise enough to make your heart beat faster? 7  How many minutes a day do you exercise enough to make your heart beat faster? 20 - 29  How many days per week do you miss taking your medication? 0  {additonal problems for provider to add (Optional):416394}    {ROS Picklists (Optional):352306}      Objective    BP (!) 160/82 (BP Location: Right arm, Patient Position: Sitting, Cuff Size: Adult Regular)   Pulse 82   Temp 98.2  F (36.8  C) (Oral)   Resp 16   Ht 1.499 m  "(4' 11\")   Wt 73.9 kg (163 lb)   SpO2 98%   BMI 32.92 kg/m    Body mass index is 32.92 kg/m .  Physical Exam   {Exam List (Optional):430091}    {Diagnostic Test Results (Optional):153716}        Signed Electronically by: Juan Washington MD  {Email feedback regarding this note to [email or web address]   :[id number]}  "

## 2024-09-21 ENCOUNTER — HEALTH MAINTENANCE LETTER (OUTPATIENT)
Age: 69
End: 2024-09-21

## 2024-12-31 DIAGNOSIS — I10 ESSENTIAL HYPERTENSION: ICD-10-CM

## 2024-12-31 RX ORDER — LISINOPRIL AND HYDROCHLOROTHIAZIDE 10; 12.5 MG/1; MG/1
1 TABLET ORAL DAILY
Qty: 90 TABLET | Refills: 3 | Status: SHIPPED | OUTPATIENT
Start: 2024-12-31

## 2025-04-02 DIAGNOSIS — I10 ESSENTIAL HYPERTENSION: ICD-10-CM

## 2025-04-02 RX ORDER — ATORVASTATIN CALCIUM 20 MG/1
20 TABLET, FILM COATED ORAL DAILY
Qty: 90 TABLET | Refills: 11 | Status: SHIPPED | OUTPATIENT
Start: 2025-04-02

## 2025-04-10 ENCOUNTER — PATIENT OUTREACH (OUTPATIENT)
Dept: CARE COORDINATION | Facility: CLINIC | Age: 70
End: 2025-04-10
Payer: COMMERCIAL

## 2025-05-17 ENCOUNTER — HEALTH MAINTENANCE LETTER (OUTPATIENT)
Age: 70
End: 2025-05-17

## 2025-06-03 ENCOUNTER — OFFICE VISIT (OUTPATIENT)
Dept: INTERNAL MEDICINE | Facility: CLINIC | Age: 70
End: 2025-06-03
Payer: COMMERCIAL

## 2025-06-03 VITALS
WEIGHT: 168 LBS | OXYGEN SATURATION: 98 % | BODY MASS INDEX: 33.87 KG/M2 | HEIGHT: 59 IN | DIASTOLIC BLOOD PRESSURE: 80 MMHG | TEMPERATURE: 97.9 F | SYSTOLIC BLOOD PRESSURE: 144 MMHG | HEART RATE: 81 BPM | RESPIRATION RATE: 16 BRPM

## 2025-06-03 DIAGNOSIS — I10 ESSENTIAL HYPERTENSION: ICD-10-CM

## 2025-06-03 DIAGNOSIS — Z12.11 SCREEN FOR COLON CANCER: ICD-10-CM

## 2025-06-03 DIAGNOSIS — Z00.00 ROUTINE GENERAL MEDICAL EXAMINATION AT A HEALTH CARE FACILITY: Primary | ICD-10-CM

## 2025-06-03 DIAGNOSIS — E66.01 MORBID OBESITY (H): ICD-10-CM

## 2025-06-03 DIAGNOSIS — Z12.31 VISIT FOR SCREENING MAMMOGRAM: ICD-10-CM

## 2025-06-03 LAB
ALBUMIN SERPL BCG-MCNC: 4.5 G/DL (ref 3.5–5.2)
ALBUMIN UR-MCNC: NEGATIVE MG/DL
ALP SERPL-CCNC: 104 U/L (ref 40–150)
ALT SERPL W P-5'-P-CCNC: 34 U/L (ref 0–50)
ANION GAP SERPL CALCULATED.3IONS-SCNC: 13 MMOL/L (ref 7–15)
APPEARANCE UR: CLEAR
AST SERPL W P-5'-P-CCNC: 28 U/L (ref 0–45)
BACTERIA #/AREA URNS HPF: ABNORMAL /HPF
BILIRUB SERPL-MCNC: 0.2 MG/DL
BILIRUB UR QL STRIP: NEGATIVE
BUN SERPL-MCNC: 13.1 MG/DL (ref 8–23)
CALCIUM SERPL-MCNC: 9.4 MG/DL (ref 8.8–10.4)
CHLORIDE SERPL-SCNC: 101 MMOL/L (ref 98–107)
CHOLEST SERPL-MCNC: 201 MG/DL
COLOR UR AUTO: YELLOW
CREAT SERPL-MCNC: 0.88 MG/DL (ref 0.51–0.95)
EGFRCR SERPLBLD CKD-EPI 2021: 71 ML/MIN/1.73M2
ERYTHROCYTE [DISTWIDTH] IN BLOOD BY AUTOMATED COUNT: 12.9 % (ref 10–15)
FASTING STATUS PATIENT QL REPORTED: YES
FASTING STATUS PATIENT QL REPORTED: YES
GLUCOSE SERPL-MCNC: 104 MG/DL (ref 70–99)
GLUCOSE UR STRIP-MCNC: NEGATIVE MG/DL
HCO3 SERPL-SCNC: 25 MMOL/L (ref 22–29)
HCT VFR BLD AUTO: 35.8 % (ref 35–47)
HDLC SERPL-MCNC: 71 MG/DL
HGB BLD-MCNC: 11.9 G/DL (ref 11.7–15.7)
HGB UR QL STRIP: ABNORMAL
KETONES UR STRIP-MCNC: NEGATIVE MG/DL
LDLC SERPL CALC-MCNC: 99 MG/DL
LEUKOCYTE ESTERASE UR QL STRIP: ABNORMAL
MCH RBC QN AUTO: 30.6 PG (ref 26.5–33)
MCHC RBC AUTO-ENTMCNC: 33.2 G/DL (ref 31.5–36.5)
MCV RBC AUTO: 92 FL (ref 78–100)
NITRATE UR QL: NEGATIVE
NONHDLC SERPL-MCNC: 130 MG/DL
PH UR STRIP: 5.5 [PH] (ref 5–8)
PLATELET # BLD AUTO: 358 10E3/UL (ref 150–450)
POTASSIUM SERPL-SCNC: 3.9 MMOL/L (ref 3.4–5.3)
PROT SERPL-MCNC: 6.9 G/DL (ref 6.4–8.3)
RBC # BLD AUTO: 3.89 10E6/UL (ref 3.8–5.2)
RBC #/AREA URNS AUTO: ABNORMAL /HPF
SODIUM SERPL-SCNC: 139 MMOL/L (ref 135–145)
SP GR UR STRIP: 1.01 (ref 1–1.03)
SQUAMOUS #/AREA URNS AUTO: ABNORMAL /LPF
TRIGL SERPL-MCNC: 157 MG/DL
TSH SERPL DL<=0.005 MIU/L-ACNC: 3.91 UIU/ML (ref 0.3–4.2)
UROBILINOGEN UR STRIP-ACNC: 0.2 E.U./DL
WBC # BLD AUTO: 4 10E3/UL (ref 4–11)
WBC #/AREA URNS AUTO: ABNORMAL /HPF

## 2025-06-03 SDOH — HEALTH STABILITY: PHYSICAL HEALTH: ON AVERAGE, HOW MANY DAYS PER WEEK DO YOU ENGAGE IN MODERATE TO STRENUOUS EXERCISE (LIKE A BRISK WALK)?: 5 DAYS

## 2025-06-03 ASSESSMENT — SOCIAL DETERMINANTS OF HEALTH (SDOH): HOW OFTEN DO YOU GET TOGETHER WITH FRIENDS OR RELATIVES?: TWICE A WEEK

## 2025-06-03 NOTE — PROGRESS NOTES
Annual Wellness Visit:  Sarah Cisneros  is a 70 year old female  who presents for an annual wellness visit.  Annual wellness visit physical exam.    Advance care planning done.    Falls risk assessment also accomplished.    Cognitive assessment was completed and the current provider this examiner and patient sharing was also completed.  Counseling discussion regarding the following- : fall prevention, nutrition, physical activity, tobacco-use cessation, social engagement, weight loss and cognition. Please provide and update patient PPPS, including personalized health advice and appropriate referral to health education or preventive counseling services or programs as needed.   Physician and patient sharing was accomplished.    I do not prescribe this patient opioids.  The patient's provider list includes myself ARLETTE ESTRADA as the primary care general internist and PCP.  Cognitive assessment was done the patient was able to draw the base of an analog clock and remember 3 items.  Labs ordered today include hemogram comprehensive metabolic profile lipid panel TSH urinalysis and PSA will be checked for male patients and will not be checked of course for female patients.  Emotional mental health was normal.  Functional capacity ADLs and safety in the home was assessed and all okay.        Assessment/Plan:  (Z00.00) Routine general medical examination at a health care facility  (primary encounter diagnosis)  Comment: General Medical examination physical exam done.  Plan: CBC with platelets, Comprehensive metabolic         panel, Lipid panel reflex to direct LDL         Fasting, TSH, UA Macroscopic with reflex to         Microscopic and Culture        No abnormalities noted on examination.    (Z12.11) Screen for colon cancer  Comment: Last colonoscopy 2011 needs repeat colonoscopy at this juncture.  Age 70  Plan: Colonoscopy Screening  Referral        Screening purposes the colonoscopy is again ordered.  Last 1 done  2011.    (Z12.31) Visit for screening mammogram  Comment: Screening mammographic testing needed.  Plan: MA SCREENING DIGITAL BILAT        Ordered.    (I10) Essential hypertension  Comment: Controlled with home readings but blood pressure slightly elevated today systolic at 160/82.  Plan: Regular exercise and weight loss will help.  Continue lisinopril HCTZ 10/12.51 daily for blood pressure control.  No history of target organ damage related to same.    (E66.01) Morbid obesity (H)  Comment: Minus elevated at 34 weight loss will help with blood pressure and improve glycemic control.  Blood sugar pending.  As part of comprehensive metabolic profile.  Plan: Needs to lose weight may be a candidate for GLP-1 therapy pending lab tests done today in a comprehensive fashion.  As part of the annual wellness visit.  Physical exam.        Subjective:   Medical History:    Non-smoker very light social alcohol no known drug allergies.    Without anesthetic complication she has had a hysterectomy for benign disease also negative complete urologic workup with [place] urologist Dr. Moncada for microscopic hematuria.    Hypertension and hyperlipidemia the latter treated with statin therapy.  Primary prevention no history of MI or stroke no history of type 2 diabetes related to insulin resistance and obesity.  BMI 34 discussed see above.  Past Medical History:   Diagnosis Date    Acid reflux     Arthritis     GERD (gastroesophageal reflux disease)     Hematuria     Hypertension     Hypertension     Labyrinthitis     Lyme disease     PONV (postoperative nausea and vomiting)     PONV (postoperative nausea and vomiting)     Shingles     Anterior chest     Current Outpatient Medications   Medication Sig Dispense Refill    atorvastatin (LIPITOR) 20 MG tablet TAKE 1 TABLET(20 MG) BY MOUTH DAILY 90 tablet 11    betamethasone dipropionate (DIPROSONE) 0.05 % external lotion Apply topically daily 60 mL 3    clobetasol (TEMOVATE) 0.05 %  external solution       clobetasol propionate (TEMOVATE) 0.05 % external cream       escitalopram (LEXAPRO) 10 MG tablet Take 1 tablet (10 mg) by mouth daily. 90 tablet 11    lisinopril-hydrochlorothiazide (ZESTORETIC) 10-12.5 MG tablet TAKE 1 TABLET BY MOUTH DAILY 90 tablet 3    pantoprazole (PROTONIX) 40 MG EC tablet TAKE 1 TABLET(40 MG) BY MOUTH DAILY 90 tablet 11     No current facility-administered medications for this visit.     Immunization History   Administered Date(s) Administered    COVID-19 12+ (MODERNA) 10/25/2023, 2024    COVID-19 Bivalent 18+ (Moderna) 10/10/2022    COVID-19 Monovalent 18+ (Moderna) 2021, 2021, 2021    Influenza (High Dose) Trivalent,PF (Fluzone) 2024    Influenza (IIV3) PF 10/13/2010, 10/05/2011, 10/11/2012, 10/22/2013, 2021    Influenza (prior to ) 10/07/2009    Influenza Vaccine 18-64 (Flublok) 10/29/2018    Influenza Vaccine 65+ (Fluzone HD) 10/10/2022    Influenza Vaccine >6 months,quad, PF 2020, 10/25/2023    Influenza Vaccine, 6+MO IM (QUADRIVALENT W/PRESERVATIVES) 10/22/2013, 10/23/2019    TDAP (Adacel,Boostrix) 2008, 2023       Surgical History:  Past Surgical History:   Procedure Laterality Date    CYSTOSCOPY, TRANSURETHRAL RESECTION (TUR) TUMOR BLADDER, COMBINED N/A 2019    Procedure: CYSTOSCOPY, TRANSURETHRAL RESECTION OF BLADDER LESION;  Surgeon: Jesus Moncada MD;  Location: [facility];  Service: Urology    EXCISE SOFT TISSUE TUMOR THIGH Right 11/10/2016    Procedure: EXCISE SOFT TISSUE TUMOR THIGH;  Surgeon: Art Carias MD;  Location: [facility]    GYN SURGERY      hysterectomy    HYSTERECTOMY      OOPHORECTOMY      ZZC TOTAL ABDOM HYSTERECTOMY      Description: Total Abdominal Hysterectomy;  Recorded: 2011;  Annotations: see 03 pathology report in EHR        Family History:  Mother  62 lung cancer a smoker.    Father  64 cerebral aneurysm bleed heavy alcohol  "consumer.    2 children well 6 grandchildren well   former patient of this examiner.  Natural causes.  Works for [employer] at [employer]  .    Social History:  Enjoys walking with neighbors widows    Health Maintenances:  Immunizations vaccines reviewed and up-to-date.  Colonoscopy mammographic testing ordered.  Last colonoscopy done  all clear then.  Needs repeat study now 14 years later after her last study.    Objective:  BP (!) 144/80   Pulse 81   Temp 97.9  F (36.6  C) (Oral)   Resp 16   Ht 1.499 m (4' 11\")   Wt 76.2 kg (168 lb)   SpO2 98%   BMI 33.93 kg/m    Shortened stature easily conversant good spirited blood pressure mildly elevated at 160/82 weight loss will help afebrile other vital signs stable.  BMI elevated at 34 discussed.    Skin negative lymph negative neuro negative chest clear heart tones normal abdomen benign mild centripetal obesity extremities free of edema cyanosis or clubbing no carotid bruits no thyromegaly or thyroid nodules no neck vein distention.  She appeared well no lymphadenopathy appreciated lymph bearing areas back was straight no severe spine tenderness.    With a very good examination and a very good discussion today.  Follow-up 4 months time recheck blood pressure weight.    Juan Washington MD    Internal Medicine  "

## 2025-06-04 ENCOUNTER — RESULTS FOLLOW-UP (OUTPATIENT)
Dept: INTERNAL MEDICINE | Facility: CLINIC | Age: 70
End: 2025-06-04

## 2025-06-04 ENCOUNTER — PATIENT OUTREACH (OUTPATIENT)
Dept: CARE COORDINATION | Facility: CLINIC | Age: 70
End: 2025-06-04
Payer: COMMERCIAL

## 2025-06-04 LAB — BACTERIA UR CULT: NORMAL

## 2025-07-13 ENCOUNTER — MYC MEDICAL ADVICE (OUTPATIENT)
Dept: INTERNAL MEDICINE | Facility: CLINIC | Age: 70
End: 2025-07-13
Payer: COMMERCIAL

## 2025-07-14 NOTE — TELEPHONE ENCOUNTER
S-(situation): Possible UTI/back pain    B-(background): started 2 days ago    A-(assessment): Pt has had lower back for 2 days. She states she thinks it is a UTI and asking for urology referral. Denies pain or burning with urination.     R-(recommendations): Advised an office visit or E-visit and pt declined. Recommended she go to urgent care, pt agreeable and [facility] to [facility].